# Patient Record
Sex: MALE | Race: WHITE | Employment: OTHER | URBAN - METROPOLITAN AREA
[De-identification: names, ages, dates, MRNs, and addresses within clinical notes are randomized per-mention and may not be internally consistent; named-entity substitution may affect disease eponyms.]

---

## 2018-01-18 ENCOUNTER — HOSPITAL ENCOUNTER (OUTPATIENT)
Dept: CT IMAGING | Age: 73
Discharge: HOME | End: 2018-01-18
Attending: PHYSICAL THERAPY ASSISTANT

## 2018-01-18 DIAGNOSIS — Z13.6 ENCOUNTER FOR SPECIAL SCREENING EXAMINATION FOR CARDIOVASCULAR DISORDER: ICD-10-CM

## 2018-02-09 NOTE — PROGRESS NOTES
Patient seen in [place] for Heart Scan. 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 I called and spoke with patient who states that he just returned from [place] last night.  Patient is not a

## 2018-02-22 PROBLEM — R93.1 ELEVATED CORONARY ARTERY CALCIUM SCORE: Status: ACTIVE | Noted: 2018-02-22

## 2018-02-22 PROBLEM — I49.3 PVC (PREMATURE VENTRICULAR CONTRACTION): Status: ACTIVE | Noted: 2018-02-22

## 2018-02-22 PROBLEM — I25.5 ISCHEMIC CARDIOMYOPATHY: Status: ACTIVE | Noted: 2018-02-22

## 2018-02-22 PROBLEM — I65.22 STENOSIS OF LEFT CAROTID ARTERY: Status: ACTIVE | Noted: 2018-02-22

## 2018-02-22 PROBLEM — I34.0 NON-RHEUMATIC MITRAL REGURGITATION: Status: ACTIVE | Noted: 2018-02-22

## 2021-07-18 ENCOUNTER — HOSPITAL ENCOUNTER (EMERGENCY)
Facility: HOSPITAL | Age: 76
Discharge: HOME | End: 2021-07-18
Attending: EMERGENCY MEDICINE
Payer: MEDICARE

## 2021-07-18 VITALS
OXYGEN SATURATION: 100 % | RESPIRATION RATE: 20 BRPM | DIASTOLIC BLOOD PRESSURE: 75 MMHG | HEART RATE: 84 BPM | TEMPERATURE: 98 F | SYSTOLIC BLOOD PRESSURE: 116 MMHG
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
## 2021-07-18 DIAGNOSIS — S30.842A HAIR TOURNIQUET OF PENIS, INITIAL ENCOUNTER: Primary | ICD-10-CM

## 2021-07-18 PROCEDURE — 99283 EMERGENCY DEPT VISIT LOW MDM: CPT

## 2021-07-18 PROCEDURE — 90471 IMMUNIZATION ADMIN: CPT

## 2021-07-18 RX ORDER — LIDOCAINE HYDROCHLORIDE 20 MG/ML
10 SOLUTION OROPHARYNGEAL ONCE
Status: COMPLETED | OUTPATIENT
Start: 2021-07-18 | End: 2021-07-18

## 2021-07-18 RX ORDER — CEPHALEXIN 500 MG/1
500 CAPSULE ORAL 3 TIMES DAILY
Qty: 21 CAPSULE | Refills: 0 | Status: SHIPPED | OUTPATIENT
Start: 2021-07-18 | End: 2021-07-25

## 2021-07-18 NOTE — ED PROVIDER NOTES
Patient Seen in: [facility] Emergency Department      History   Patient presents with:  Eval-G    Stated Complaint: toma pain    HPI/Subjective:   HPI    Patient complains of pain and swelling to the penis.   Patient tells me that over the course of are descended in the scrotum is completely nonerythematous. Testes are freely mobile and completely nontender. The more proximal penis is unremarkable. There does seem to be a circumferential constriction just proximal to the glans.   In examining this a Medication List as of 7/18/2021  3:49 PM    START taking these medications    cephALEXin 500 MG Oral Cap  Take 1 capsule (500 mg total) by mouth 3 (three) times daily for 7 days. , Normal, Disp-21 capsule, R-0

## 2021-07-18 NOTE — ED INITIAL ASSESSMENT (HPI)
Pt to the emergency room for swelling below the head of the penis. Pt reports it started yesterday and has been worsening since. No prior hx of injury to the site. Pt denies pain with urination, urgency and frequency.

## 2021-07-29 ENCOUNTER — ORDER TRANSCRIPTION (OUTPATIENT)
Dept: PHYSICAL THERAPY | Facility: HOSPITAL | Age: 76
End: 2021-07-29

## 2021-07-29 DIAGNOSIS — R26.89 IMBALANCE: ICD-10-CM

## 2021-07-29 DIAGNOSIS — R26.9 GAIT ABNORMALITY: ICD-10-CM

## 2021-07-29 DIAGNOSIS — G95.9 LUMBAR MYELOPATHY (HCC): Primary | ICD-10-CM

## 2021-07-31 ENCOUNTER — APPOINTMENT (OUTPATIENT)
Dept: CT IMAGING | Facility: HOSPITAL | Age: 76
DRG: 552 | End: 2021-07-31
Attending: EMERGENCY MEDICINE
Payer: MEDICARE

## 2021-07-31 ENCOUNTER — HOSPITAL ENCOUNTER (INPATIENT)
Facility: HOSPITAL | Age: 76
LOS: 3 days | Discharge: NURSING, REHAB OR LONG-TERM CARE | DRG: 552 | End: 2021-08-03
Attending: EMERGENCY MEDICINE | Admitting: HOSPITALIST
Payer: MEDICARE

## 2021-07-31 DIAGNOSIS — R26.81 UNSTEADY GAIT WHEN WALKING: ICD-10-CM

## 2021-07-31 DIAGNOSIS — R29.6 FREQUENT FALLS: Primary | ICD-10-CM

## 2021-07-31 LAB
ALBUMIN SERPL-MCNC: 3.3 G/DL (ref 3.4–5)
ALBUMIN/GLOB SERPL: 0.9 {RATIO} (ref 1–2)
ALP LIVER SERPL-CCNC: 50 U/L
ALT SERPL-CCNC: 38 U/L
ANION GAP SERPL CALC-SCNC: 2 MMOL/L (ref 0–18)
AST SERPL-CCNC: 18 U/L (ref 15–37)
BASOPHILS # BLD AUTO: 0.04 X10(3) UL (ref 0–0.2)
BASOPHILS NFR BLD AUTO: 0.5 %
BILIRUB SERPL-MCNC: 0.4 MG/DL (ref 0.1–2)
BILIRUB UR QL STRIP.AUTO: NEGATIVE
BUN BLD-MCNC: 15 MG/DL (ref 7–18)
CALCIUM BLD-MCNC: 8.7 MG/DL (ref 8.5–10.1)
CHLORIDE SERPL-SCNC: 102 MMOL/L (ref 98–112)
CLARITY UR REFRACT.AUTO: CLEAR
CO2 SERPL-SCNC: 29 MMOL/L (ref 21–32)
CREAT BLD-MCNC: 0.97 MG/DL
EOSINOPHIL # BLD AUTO: 0.24 X10(3) UL (ref 0–0.7)
EOSINOPHIL NFR BLD AUTO: 3 %
ERYTHROCYTE [DISTWIDTH] IN BLOOD BY AUTOMATED COUNT: 13.7 %
GLOBULIN PLAS-MCNC: 3.5 G/DL (ref 2.8–4.4)
GLUCOSE BLD-MCNC: 100 MG/DL (ref 70–99)
GLUCOSE UR STRIP.AUTO-MCNC: NEGATIVE MG/DL
HAV IGM SER QL: 2.1 MG/DL (ref 1.6–2.6)
HCT VFR BLD AUTO: 44 %
HGB BLD-MCNC: 14.7 G/DL
HYALINE CASTS #/AREA URNS AUTO: PRESENT /LPF
IMM GRANULOCYTES # BLD AUTO: 0.02 X10(3) UL (ref 0–1)
IMM GRANULOCYTES NFR BLD: 0.2 %
KETONES UR STRIP.AUTO-MCNC: NEGATIVE MG/DL
LEUKOCYTE ESTERASE UR QL STRIP.AUTO: NEGATIVE
LYMPHOCYTES # BLD AUTO: 1.21 X10(3) UL (ref 1–4)
LYMPHOCYTES NFR BLD AUTO: 14.9 %
M PROTEIN MFR SERPL ELPH: 6.8 G/DL (ref 6.4–8.2)
MCH RBC QN AUTO: 29.2 PG (ref 26–34)
MCHC RBC AUTO-ENTMCNC: 33.4 G/DL (ref 31–37)
MCV RBC AUTO: 87.5 FL
MONOCYTES # BLD AUTO: 1.43 X10(3) UL (ref 0.1–1)
MONOCYTES NFR BLD AUTO: 17.6 %
NEUTROPHILS # BLD AUTO: 5.19 X10 (3) UL (ref 1.5–7.7)
NEUTROPHILS # BLD AUTO: 5.19 X10(3) UL (ref 1.5–7.7)
NEUTROPHILS NFR BLD AUTO: 63.8 %
NITRITE UR QL STRIP.AUTO: NEGATIVE
OSMOLALITY SERPL CALC.SUM OF ELEC: 277 MOSM/KG (ref 275–295)
PH UR STRIP.AUTO: 5 [PH] (ref 5–8)
PLATELET # BLD AUTO: 251 10(3)UL (ref 150–450)
POTASSIUM SERPL-SCNC: 4.6 MMOL/L (ref 3.5–5.1)
PROT UR STRIP.AUTO-MCNC: NEGATIVE MG/DL
RBC # BLD AUTO: 5.03 X10(6)UL
RBC UR QL AUTO: NEGATIVE
SARS-COV-2 RNA RESP QL NAA+PROBE: NOT DETECTED
SODIUM SERPL-SCNC: 133 MMOL/L (ref 136–145)
SP GR UR STRIP.AUTO: 1.01 (ref 1–1.03)
T4 FREE SERPL-MCNC: 1 NG/DL (ref 0.8–1.7)
TSI SER-ACNC: 1.36 MIU/ML (ref 0.36–3.74)
UROBILINOGEN UR STRIP.AUTO-MCNC: <2 MG/DL
WBC # BLD AUTO: 8.1 X10(3) UL (ref 4–11)

## 2021-07-31 PROCEDURE — 70450 CT HEAD/BRAIN W/O DYE: CPT | Performed by: EMERGENCY MEDICINE

## 2021-07-31 PROCEDURE — 99223 1ST HOSP IP/OBS HIGH 75: CPT | Performed by: HOSPITALIST

## 2021-07-31 PROCEDURE — 72131 CT LUMBAR SPINE W/O DYE: CPT | Performed by: EMERGENCY MEDICINE

## 2021-07-31 RX ORDER — ONDANSETRON 2 MG/ML
4 INJECTION INTRAMUSCULAR; INTRAVENOUS EVERY 6 HOURS PRN
Status: DISCONTINUED | OUTPATIENT
Start: 2021-07-31 | End: 2021-08-03

## 2021-07-31 RX ORDER — ALPRAZOLAM 0.5 MG/1
0.5 TABLET ORAL 2 TIMES DAILY
Status: DISCONTINUED | OUTPATIENT
Start: 2021-07-31 | End: 2021-08-02

## 2021-07-31 RX ORDER — METOCLOPRAMIDE HYDROCHLORIDE 5 MG/ML
10 INJECTION INTRAMUSCULAR; INTRAVENOUS EVERY 8 HOURS PRN
Status: DISCONTINUED | OUTPATIENT
Start: 2021-07-31 | End: 2021-08-02

## 2021-07-31 RX ORDER — MONTELUKAST SODIUM 10 MG/1
10 TABLET ORAL NIGHTLY
Status: DISCONTINUED | OUTPATIENT
Start: 2021-07-31 | End: 2021-08-03

## 2021-07-31 RX ORDER — MONTELUKAST SODIUM 10 MG/1
10 TABLET ORAL NIGHTLY
COMMUNITY

## 2021-07-31 RX ORDER — SODIUM CHLORIDE 9 MG/ML
INJECTION, SOLUTION INTRAVENOUS CONTINUOUS
Status: DISCONTINUED | OUTPATIENT
Start: 2021-07-31 | End: 2021-08-02

## 2021-07-31 RX ORDER — GUAIFENESIN 600 MG
1200 TABLET, EXTENDED RELEASE 12 HR ORAL NIGHTLY
Status: DISCONTINUED | OUTPATIENT
Start: 2021-07-31 | End: 2021-08-03

## 2021-07-31 RX ORDER — ENOXAPARIN SODIUM 100 MG/ML
40 INJECTION SUBCUTANEOUS DAILY
Status: DISCONTINUED | OUTPATIENT
Start: 2021-08-01 | End: 2021-08-03

## 2021-07-31 RX ORDER — CYCLOBENZAPRINE HCL 10 MG
10 TABLET ORAL NIGHTLY
COMMUNITY
End: 2021-07-31

## 2021-07-31 RX ORDER — DOCUSATE SODIUM 100 MG/1
100 CAPSULE, LIQUID FILLED ORAL DAILY
COMMUNITY

## 2021-07-31 RX ORDER — ATORVASTATIN CALCIUM 20 MG/1
20 TABLET, FILM COATED ORAL DAILY
COMMUNITY

## 2021-07-31 RX ORDER — DESVENLAFAXINE 100 MG/1
100 TABLET, EXTENDED RELEASE ORAL DAILY
Status: CANCELLED | OUTPATIENT
Start: 2021-08-01

## 2021-07-31 RX ORDER — MIRABEGRON 50 MG/1
50 TABLET, FILM COATED, EXTENDED RELEASE ORAL DAILY
COMMUNITY

## 2021-07-31 RX ORDER — ISOSORBIDE MONONITRATE 30 MG/1
30 TABLET, EXTENDED RELEASE ORAL DAILY
COMMUNITY

## 2021-07-31 RX ORDER — DESVENLAFAXINE 100 MG/1
100 TABLET, EXTENDED RELEASE ORAL DAILY
Status: ON HOLD | COMMUNITY
End: 2021-08-03

## 2021-07-31 RX ORDER — ALPRAZOLAM 0.5 MG/1
0.5 TABLET ORAL 2 TIMES DAILY
Status: ON HOLD | COMMUNITY
End: 2021-08-03

## 2021-07-31 RX ORDER — ISOSORBIDE MONONITRATE 30 MG/1
30 TABLET, EXTENDED RELEASE ORAL DAILY
Status: DISCONTINUED | OUTPATIENT
Start: 2021-08-01 | End: 2021-08-03

## 2021-07-31 RX ORDER — ATORVASTATIN CALCIUM 20 MG/1
20 TABLET, FILM COATED ORAL DAILY
Status: DISCONTINUED | OUTPATIENT
Start: 2021-08-01 | End: 2021-08-03

## 2021-07-31 RX ORDER — DOCUSATE SODIUM 100 MG/1
100 CAPSULE, LIQUID FILLED ORAL DAILY
Status: DISCONTINUED | OUTPATIENT
Start: 2021-08-01 | End: 2021-08-03

## 2021-07-31 RX ORDER — ASPIRIN 81 MG/1
81 TABLET ORAL DAILY
Status: DISCONTINUED | OUTPATIENT
Start: 2021-07-31 | End: 2021-08-03

## 2021-07-31 RX ORDER — CYCLOBENZAPRINE HCL 10 MG
10 TABLET ORAL NIGHTLY
Status: ON HOLD | COMMUNITY
End: 2021-08-03

## 2021-07-31 RX ORDER — ATORVASTATIN CALCIUM 20 MG/1
20 TABLET, FILM COATED ORAL NIGHTLY
Status: DISCONTINUED | OUTPATIENT
Start: 2021-07-31 | End: 2021-07-31

## 2021-07-31 RX ORDER — ACETAMINOPHEN 325 MG/1
650 TABLET ORAL EVERY 6 HOURS PRN
Status: DISCONTINUED | OUTPATIENT
Start: 2021-07-31 | End: 2021-08-03

## 2021-07-31 RX ORDER — FLUTICASONE FUROATE AND VILANTEROL TRIFENATATE 100; 25 UG/1; UG/1
1 POWDER RESPIRATORY (INHALATION) DAILY
COMMUNITY

## 2021-07-31 RX ORDER — SODIUM CHLORIDE 9 MG/ML
INJECTION, SOLUTION INTRAVENOUS CONTINUOUS
Status: ACTIVE | OUTPATIENT
Start: 2021-07-31 | End: 2021-07-31

## 2021-07-31 RX ORDER — GUAIFENESIN 600 MG
600 TABLET, EXTENDED RELEASE 12 HR ORAL NIGHTLY
Status: ON HOLD | COMMUNITY
End: 2021-08-03

## 2021-07-31 NOTE — H&P
[facility]ITAL[facility]  History and Physical     Sherri Combs Patient Status:  Emergency    10/7/1945 MRN [id number]   Location [address] Attending Francis Encinas, [address] [facility] Day # 0 PCP MARILEE BOWMAN     Chief Complaint: Falls    Hi Relation Age of Onset   • Stroke Mother         Allergies:   Erythromycin            UNKNOWN    Comment:Cerner Allergy Text Annotation: erythromycin  Tetracycline            UNKNOWN    Comment:Cerner Allergy Text Annotation: tetracycline    Medications:  N nondistended. Positive bowel sounds. No rebound, guarding or organomegaly. Neurologic: Power 5/5 bilaterally, sensory intact. Patient with hyperreflexia in all ext. Musculoskeletal: Moves all extremities. Extremities: No edema or cyanosis.   Integument: 9. YVONNE  1. YVONNE protocol  10. Depression  11. Anxiety  12. PTSD  1. Stop Pristiq  2. Xanax BID  3. Psych consult   13. Prostate cancer      Quality:  · DVT Prophylaxis: Lovenox  · Love: No    Plan of care discussed with patient, caregiver and ER.     Mile

## 2021-07-31 NOTE — ED PROVIDER NOTES
Patient Seen in: [facility] Emergency Department      History   Patient presents with:  Dizziness  Fatigue  Fall    Stated Complaint: feeling off balance, increased weakness over past 3 days    HPI/Subjective:   HPI    13-year-old male presents madiha Smokeless tobacco: Former User    Alcohol use: Yes    Drug use: No             Review of Systems    Positive for stated complaint: feeling off balance, increased weakness over past 3 days  Other systems are as noted in HPI.   Constitutional and vital signs Sodium 133 (*)     Albumin 3.3 (*)     A/G Ratio 0.9 (*)     All other components within normal limits   URINALYSIS WITH CULTURE REFLEX - Abnormal; Notable for the following components:    Squamous Epi.  Cells Few (*)     Hyaline Casts Present (*)     All o now frequent falls increasing the last 24 hours. Patient did fall and hit his head today. Patient will require admission for further evaluation, discussed with Dr. Derek Suh hospitalist physician.   Discussed all results with the patient and care at the beds

## 2021-07-31 NOTE — ED INITIAL ASSESSMENT (HPI)
Patient relates he experienced two falls from standing today after feeling like he lost his balance. Patient denies LOC but relates he thinks he did strike his head on the floor. Patient was complaining of lower back pain that has since resolved.

## 2021-08-01 LAB
ALBUMIN SERPL-MCNC: 3.2 G/DL (ref 3.4–5)
ALBUMIN/GLOB SERPL: 0.9 {RATIO} (ref 1–2)
ALP LIVER SERPL-CCNC: 50 U/L
ALT SERPL-CCNC: 37 U/L
ANION GAP SERPL CALC-SCNC: 3 MMOL/L (ref 0–18)
AST SERPL-CCNC: 18 U/L (ref 15–37)
ATRIAL RATE: 84 BPM
BASOPHILS # BLD AUTO: 0.05 X10(3) UL (ref 0–0.2)
BASOPHILS NFR BLD AUTO: 0.6 %
BILIRUB SERPL-MCNC: 0.5 MG/DL (ref 0.1–2)
BUN BLD-MCNC: 13 MG/DL (ref 7–18)
CALCIUM BLD-MCNC: 8.7 MG/DL (ref 8.5–10.1)
CHLORIDE SERPL-SCNC: 106 MMOL/L (ref 98–112)
CO2 SERPL-SCNC: 29 MMOL/L (ref 21–32)
CREAT BLD-MCNC: 0.83 MG/DL
EOSINOPHIL # BLD AUTO: 0.33 X10(3) UL (ref 0–0.7)
EOSINOPHIL NFR BLD AUTO: 3.8 %
ERYTHROCYTE [DISTWIDTH] IN BLOOD BY AUTOMATED COUNT: 13.5 %
GLOBULIN PLAS-MCNC: 3.4 G/DL (ref 2.8–4.4)
GLUCOSE BLD-MCNC: 95 MG/DL (ref 70–99)
HCT VFR BLD AUTO: 45 %
HGB BLD-MCNC: 14.2 G/DL
IMM GRANULOCYTES # BLD AUTO: 0.03 X10(3) UL (ref 0–1)
IMM GRANULOCYTES NFR BLD: 0.3 %
LYMPHOCYTES # BLD AUTO: 1.38 X10(3) UL (ref 1–4)
LYMPHOCYTES NFR BLD AUTO: 16.1 %
M PROTEIN MFR SERPL ELPH: 6.6 G/DL (ref 6.4–8.2)
MCH RBC QN AUTO: 28.2 PG (ref 26–34)
MCHC RBC AUTO-ENTMCNC: 31.6 G/DL (ref 31–37)
MCV RBC AUTO: 89.3 FL
MONOCYTES # BLD AUTO: 1.31 X10(3) UL (ref 0.1–1)
MONOCYTES NFR BLD AUTO: 15.3 %
NEUTROPHILS # BLD AUTO: 5.49 X10 (3) UL (ref 1.5–7.7)
NEUTROPHILS # BLD AUTO: 5.49 X10(3) UL (ref 1.5–7.7)
NEUTROPHILS NFR BLD AUTO: 63.9 %
OSMOLALITY SERPL CALC.SUM OF ELEC: 286 MOSM/KG (ref 275–295)
P AXIS: 40 DEGREES
P-R INTERVAL: 188 MS
PLATELET # BLD AUTO: 248 10(3)UL (ref 150–450)
POTASSIUM SERPL-SCNC: 4.4 MMOL/L (ref 3.5–5.1)
Q-T INTERVAL: 392 MS
QRS DURATION: 130 MS
QTC CALCULATION (BEZET): 463 MS
R AXIS: 16 DEGREES
RBC # BLD AUTO: 5.04 X10(6)UL
SODIUM SERPL-SCNC: 138 MMOL/L (ref 136–145)
T AXIS: -24 DEGREES
VENTRICULAR RATE: 84 BPM
VIT B12 SERPL-MCNC: 429 PG/ML (ref 193–986)
WBC # BLD AUTO: 8.6 X10(3) UL (ref 4–11)

## 2021-08-01 PROCEDURE — 99223 1ST HOSP IP/OBS HIGH 75: CPT | Performed by: OTHER

## 2021-08-01 PROCEDURE — 99232 SBSQ HOSP IP/OBS MODERATE 35: CPT | Performed by: HOSPITALIST

## 2021-08-01 NOTE — OCCUPATIONAL THERAPY NOTE
OCCUPATIONAL THERAPY EVALUATION - INPATIENT     Room Number: 160/620-W  Evaluation Date: 8/1/2021  Type of Evaluation: Initial  Presenting Problem: falls    Physician Order: IP Consult to Occupational Therapy  Reason for Therapy: ADL/IADL Dysfunction and D Hearing aides; Reading glasses    Prior Level of Function: Patient reports independent in all I/ADL and typically functional mobility without device prior to admission, although has been using rollator in community and walking sticks in house lately.  When h taking off regular upper body clothing?: A Little (setup)  -   Taking care of personal grooming such as brushing teeth?: A Little (in standing)  -   Eating meals?: A Little (setup)    AM-PAC Score:  Score: 17  Approx Degree of Impairment: 50.11%  Standardi adaptive techniques.  These deficits impact the patient’s ability to participate in lower body dressing/bathing, toileting, toilet transfers, bed mobility, functional mobility, instrumental activities of daily living, rest and sleep, work, leisure and Kash moines will perform lower body dressing with Mod I  Patient will perform toileting with Mod I    FUNCTIONAL TRANSFER GOALS   Patient will perform supine to sit with Mod I  Patient will perform sit to supine with Mod I  Patient will transfer to toilet with Mod I

## 2021-08-01 NOTE — CONSULTS
[address] [place] [address] with [facility]  8/1/2021    10:48 AM      Consultation was requested because of falling. Patient is known to have small vessel disease on a recent MRI.   He has a longstanding history of be thyromegaly  Heart S1S2, no murmur  Lungs are clear, no wheezing  Abd: soft  Extremities: no cyanosis or edema    NEURO  On examination the patient does not exhibit any bradykinesia or any truncal rigidity. No tremors.   He exhibits a slightly broad-based

## 2021-08-01 NOTE — PLAN OF CARE
Patient alert and oriented x 4 ,resting comfortable ,denies any pain ,neuro check intact ,moving all extremities well ,vital signs stable ,hx of deidra using mouth guard with head band ,tele sinus rhythm ,orthostatic blood pressure done as ordered ,voiding pe

## 2021-08-01 NOTE — PROGRESS NOTES
08/01/21 1458   Clinical Encounter Type   Visited With Patient   Routine Visit Introduction   Continue Visiting No  (upon request or as needed)   Patient Spiritual Encounters   Spiritual Needs patient has spiritual strength   Spiritual Interventions priti

## 2021-08-01 NOTE — PHYSICAL THERAPY NOTE
PHYSICAL THERAPY EVALUATION - INPATIENT     Room Number: 408/417-A  Evaluation Date: 8/1/2021  Type of Evaluation: Initial  Physician Order: PT Eval and Treat    Presenting Problem: fall, weakness  Reason for Therapy: Mobility Dysfunction and Dischar prostatet cancer, right hip repacement.        HOME SITUATION  [place]   Home Layout: Two level  Stairs to Enter : 2     Stairs to Bedroom: 13  Railing: Yes    Lives With: Friend(s)  Drives: No (not recently, hopes to resume)  Patient Owned O2 WALK       AM-PAC '6-Clicks' INPATIENT SHORT FORM - BASIC MOBILITY  How much difficulty does the patient currently have. ..  -   Turning over in bed (including adjusting bedclothes, sheets and blankets)?: None   -   Sitting down on and standing up fr goals for the next session. Emphasized to patient need to call staff for any transfers/standing/gait to decrease fall risk. Patient End of Session: Up in chair;Needs met;Call light within reach;RN aware of session/findings; All patient questions and con assistance level: independent     Goal #3 Patient is able to ambulate 200 feet with assist device: none at assistance level: modified independent     Goal #4 Patient to be modified independent ascending/descending full flight of stairs with 2 railings   Go

## 2021-08-02 ENCOUNTER — APPOINTMENT (OUTPATIENT)
Dept: MRI IMAGING | Facility: HOSPITAL | Age: 76
DRG: 552 | End: 2021-08-02
Attending: Other
Payer: MEDICARE

## 2021-08-02 PROCEDURE — 99232 SBSQ HOSP IP/OBS MODERATE 35: CPT | Performed by: OTHER

## 2021-08-02 PROCEDURE — 72148 MRI LUMBAR SPINE W/O DYE: CPT | Performed by: OTHER

## 2021-08-02 PROCEDURE — 99231 SBSQ HOSP IP/OBS SF/LOW 25: CPT | Performed by: HOSPITALIST

## 2021-08-02 PROCEDURE — 90792 PSYCH DIAG EVAL W/MED SRVCS: CPT | Performed by: OTHER

## 2021-08-02 RX ORDER — DESVENLAFAXINE 25 MG/1
25 TABLET, EXTENDED RELEASE ORAL DAILY
Status: DISCONTINUED | OUTPATIENT
Start: 2021-08-05 | End: 2021-08-03

## 2021-08-02 RX ORDER — ALPRAZOLAM 0.25 MG/1
0.25 TABLET ORAL 2 TIMES DAILY
Status: DISCONTINUED | OUTPATIENT
Start: 2021-08-02 | End: 2021-08-03

## 2021-08-02 RX ORDER — DESVENLAFAXINE 50 MG/1
50 TABLET, EXTENDED RELEASE ORAL DAILY
Status: DISCONTINUED | OUTPATIENT
Start: 2021-08-02 | End: 2021-08-03

## 2021-08-02 NOTE — OCCUPATIONAL THERAPY NOTE
OCCUPATIONAL THERAPY TREATMENT NOTE - INPATIENT     Room Number: 813/552-Z  Session: 1   Number of Visits to Meet Established Goals: 4    Presenting Problem: falls    History related to current admission: Patient is 66year old male admitted on 7/31/2021 f clothing?: A Lot  -   Bathing (including washing, rinsing, drying)?: A Little  -   Toileting, which includes using toilet, bedpan or urinal? : A Little (simulated)  -   Putting on and taking off regular upper body clothing?: A Little (setup)  -   Taking ca function. OT Discharge Recommendations: Sub-acute rehabilitation  OT Device Recommendations: TBD    PLAN  OT Treatment Plan: Balance activities; ADL training;Functional transfer training; Endurance training;Patient/Family education;Patient/Family training

## 2021-08-02 NOTE — CONSULTS
I did have an opportunity to speak with Mr. Lida Travis this afternoon via the telephone. He was referred by Dr. Verito Mcclendon ostensibly to arrange for outpatient individual psychotherapy.  In our conversation he acknowledged a history of depression and experssed his

## 2021-08-02 NOTE — PHYSICAL THERAPY NOTE
PHYSICAL THERAPY TREATMENT NOTE - INPATIENT    Room Number: 927/818-L     Session: 1   Number of Visits to Meet Established Goals: 2    Presenting Problem: fall, weakness      History related to current admission: Patient came in secondary to 2 falls on 7 self-stated goal is to get stronger.     OBJECTIVE  Precautions: Bed/chair alarm    WEIGHT BEARING RESTRICTION  Weight Bearing Restriction: None                PAIN ASSESSMENT   Ratin  Location: denies pain       BALANCE strength and balance with focus on abdominal bracing during therapeutic ex to support lumbar spine. Pt able to complete ex without difficulty. Pt educated on continuing therapeutic ex for increase strength.   Pt left in sitting with chair alarm set, call l

## 2021-08-02 NOTE — CONSULTS
PHYSICAL MEDICINE AND REHABILITATION CONSULTATION       Location [facility] 3SW-A Attending Ce Puentes MD   Hosp Day # 2 PCP [address] [facility]     Patient Identification  Pradeep Isabel is a 66year old male.   :  10/7/1945  Admit Date:  2021  A hr tab 30 mg, 30 mg, Oral, Daily  guaiFENesin ER (MUCINEX) 12 hr tab 1,200 mg, 1,200 mg, Oral, Nightly  Mirabegron ER TB24 50 mg, 50 mg, Oral, Daily  Montelukast Sodium (SINGULAIR) tab 10 mg, 10 mg, Oral, Nightly  enoxaparin (LOVENOX) 40 MG/0.4ML injection (1.803 m)   Wt 168 lb (76.2 kg)   SpO2 98%   BMI 23.43 kg/m²   Body mass index is 23.43 kg/m². General: well nourished, NAD  Eyes: conjunctiva and lids intact, pupils are equal and round  ENMT: external inspection of ears and nose within normal limits.  Raf Benítez

## 2021-08-02 NOTE — CM/SW NOTE
Met with pt and provided initial list from [facility] of status of referrals for LANDRY. Discussed that final list will be available for patient in AM.  Pt confirmed he would like his friend, Spencer Up to be involved in [address].   He will obtain her email so th

## 2021-08-02 NOTE — PLAN OF CARE
Problem: Falls  Data: Ax04. [place], hearing aids. Neurochecks. Full range of motion. Telemetry sinus rhythm. , on room air, mouthguard overnight. Afebrile. Denied pain. Continent. Up with 1 walker. Regular diet. IVF. Fall precautions.  Scheduled for MRI of t

## 2021-08-02 NOTE — PROGRESS NOTES
[address] [facility] Progress Note    Selwyn Velez Patient Status:  Inpatient    10/7/1945 MRN [id number]   [facility] 3SW-A Attending Romero Ortiz MD   Hosp Day # 2 PCP MARILEE BOWMAN     Subjective:  Selwyn Velez is a(n) 66 Component Value Date    K 4.4 08/01/2021    K 4.6 07/31/2021    K 4.30 11/28/2018     Lab Results   Component Value Date    BUN 13 08/01/2021    BUN 15 07/31/2021    BUN 14 11/28/2018     Lab Results   Component Value Date    CREATSERUM 0.83 08/01/2021 Mild to moderate bilateral SI joint arthropathy.     Dictated by (CST): Verlon Fails, DO on 7/31/2021 at 5:09 PM     Finalized by (CST): Verlon Fails, DO on 7/31/2021 at 5:14 PM       Per report  MRI brain with and without contrast (7/26/2021):     Multiple

## 2021-08-02 NOTE — PROGRESS NOTES
[facility]  Progress Note     Selwyn Sjogren Patient Status:  Inpatient    10/7/1945 MRN [id number]   [facility] 3SW-A Attending Romero Ortiz MD   Hosp Day # 2 PCP MARILEE BOWMAN     Chief Complaint: Falls    S: Patient denies any p Kinase  No results for input(s): CK in the last 168 hours. Inflammatory Markers  No results for input(s): CRP, LEAH, LDH, DDIMER in the last 168 hours. Imaging: Imaging data reviewed in Epic.     Medications:   • ALPRAZolam  0.5 mg Oral BID   • aspirin

## 2021-08-02 NOTE — CONSULTS
[facility]  Report of Psychiatric Consultation    Joanie Landry Patient Status:  Inpatient    10/7/1945 MRN [id number]   [facility] 3SW-A Attending Elizabeth Sebastian MD   [facility] Day # 2 PCP MARILEE BOWMAN     Date of Admission: 21  Date talk to him and make sure he has his office information.      Evelyn Agarwal  8/2/2021  11:02 AM    History of Present Illness:  65 yo WM  for over 48 yrs with severe major depression was admitted on 7/31/21 for eval of his unstable gait and recent falls swimming and went back to the [facility]. He took his clothes off and checked his phone. 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 They requested he give more talks.  This is when he acc Developmental History: Retired . See HPI.      Past Medical History:   Diagnosis Date   • Ataxia    • Atherosclerosis of coronary artery    • Depression    • Heart attack ([facility] [address]    • PVC (premature ventricular contraction)    • Stroke ([facility])     min Status Exam:     Objective       08/02/21  0750   BP: 124/89   Pulse: 75   Resp: 18   Temp: 98.7 °F (37.1 °C)     Appearance: fair grooming  Behavior: normal psychomotor  Attitude: cooperative and consistent  Gait: not observed    Speech: normal rate, rhyt

## 2021-08-02 NOTE — CM/SW NOTE
08/02/21 1500   CM/SW Referral Data   Referral Source Social Work (self-referral)   Reason for Referral Discharge planning   Informant Patient;Friend;Edward Staff   Patient Info   Patient's Mental Status Alert;Oriented;Memory Impairments   Patient's Yovani decisions. They would like to be involved in DC planning decisions. They also prefer acute rehab and she would like to speak with WILL SANCHES.      Read Flood with Wilhelm Bumpers from [facility]  who will contact pt's friend/sister to address their concerns.   /case primo

## 2021-08-03 VITALS
RESPIRATION RATE: 18 BRPM | OXYGEN SATURATION: 95 % | TEMPERATURE: 99 F | BODY MASS INDEX: 23.52 KG/M2 | WEIGHT: 168 LBS | HEIGHT: 71 IN | DIASTOLIC BLOOD PRESSURE: 69 MMHG | SYSTOLIC BLOOD PRESSURE: 115 MMHG | HEART RATE: 77 BPM

## 2021-08-03 PROCEDURE — 99239 HOSP IP/OBS DSCHRG MGMT >30: CPT | Performed by: HOSPITALIST

## 2021-08-03 PROCEDURE — 99232 SBSQ HOSP IP/OBS MODERATE 35: CPT | Performed by: OTHER

## 2021-08-03 RX ORDER — DESVENLAFAXINE 25 MG/1
TABLET, EXTENDED RELEASE ORAL
Qty: 21 TABLET | Refills: 0 | Status: SHIPPED | OUTPATIENT
Start: 2021-08-03

## 2021-08-03 RX ORDER — GUAIFENESIN 1200 MG/1
1200 TABLET, EXTENDED RELEASE ORAL NIGHTLY
Refills: 0 | Status: SHIPPED | COMMUNITY
Start: 2021-08-03

## 2021-08-03 RX ORDER — ALPRAZOLAM 0.5 MG/1
0.25 TABLET ORAL 2 TIMES DAILY
Qty: 1 TABLET | Refills: 0 | Status: SHIPPED | COMMUNITY
Start: 2021-08-03 | End: 2021-08-05

## 2021-08-03 NOTE — PLAN OF CARE
Denies pain. Instructed on plan of care, call don't fall protocol, call for all asst needed. Gait steady. Safety precautions maintained. Discharge plan to sub-acute rehab. Message left on voice mail for mri dept regarding order for mri.
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
## 2021-08-03 NOTE — CM/SW NOTE
Met with pt and presented AIDIN list of accepting facilities. Pt confirmed preference for [facility]. Discussed possible DC later today. Pt agreeable with SW contacting pt's friend, Anita Levi [phone].   Spoke with Anita Levi who expressed agreement wi

## 2021-08-03 NOTE — PROGRESS NOTES
NURSING DISCHARGE NOTE    Discharged Nursing home via Wheelchair. Accompanied by Friend  Belongings Taken by patient/family. PIV removed. Patient home meds returned to patient prior to discharge.  Discharge education provided and all questions answere

## 2021-08-03 NOTE — DISCHARGE SUMMARY
[facility] HOSPITALIST  DISCHARGE SUMMARY     Alexus Butler Patient Status:  Inpatient    10/7/1945 MRN [id number]   [facility] 3SW-A Attending Luciana Epley, MD   [facility] Day # 3 PCP MARILEE BOWMAN     Date of Admission: 2021  Date of Dischar Patient felt to have issues d/t medication (Pristiq). Patient admitted with neurology and psychiatry on consult. Neurologic work-up unremarkable. Pristiq taper in progress. PT/OT recommended [facility]. Discharge to [facility] today, Pristiq taper.     Lace+ Score: 66  5 Take 100 mg by mouth daily.    Refills: 0        STOP taking these medications    COMPLETE PROSTATE HEALTH OR        cyclobenzaprine 10 MG Tabs  Commonly known as: FLEXERIL              Where to Get Your Medications      Please  your prescriptions

## 2021-08-03 NOTE — PLAN OF CARE
Patient alert and oriented x 4 ,impulsive ,noncompliant with safety precautions ,gets up without calling ,bed alarm on all time . MRI of lumbar spine done tonight ,vital signs stable ,denies any chest pain or short of breath,tele with sinus rhythm ,refusing

## 2021-08-04 ENCOUNTER — NURSE ONLY (OUTPATIENT)
Dept: LAB | Age: 76
End: 2021-08-04
Attending: FAMILY MEDICINE
Payer: MEDICARE

## 2021-08-04 ENCOUNTER — EXTERNAL FACILITY (OUTPATIENT)
Dept: FAMILY MEDICINE CLINIC | Facility: CLINIC | Age: 76
End: 2021-08-04

## 2021-08-04 DIAGNOSIS — N32.81 DETRUSOR INSTABILITY OF BLADDER: Primary | ICD-10-CM

## 2021-08-04 DIAGNOSIS — R29.6 FREQUENT FALLS: ICD-10-CM

## 2021-08-04 DIAGNOSIS — Z85.46 HISTORY OF PROSTATE CANCER: ICD-10-CM

## 2021-08-04 DIAGNOSIS — I25.5 ISCHEMIC CARDIOMYOPATHY: ICD-10-CM

## 2021-08-04 DIAGNOSIS — F32.2 MAJOR DEPRESSIVE DISORDER, SINGLE EPISODE, SEVERE (HCC): ICD-10-CM

## 2021-08-04 DIAGNOSIS — R26.81 UNSTEADY GAIT WHEN WALKING: Primary | ICD-10-CM

## 2021-08-04 DIAGNOSIS — Z11.59 SPECIAL SCREENING EXAMINATION FOR VIRAL DISEASE: ICD-10-CM

## 2021-08-04 DIAGNOSIS — R27.0 ATAXIA: ICD-10-CM

## 2021-08-04 LAB
ALBUMIN SERPL-MCNC: 3.3 G/DL (ref 3.4–5)
ALBUMIN/GLOB SERPL: 1 {RATIO} (ref 1–2)
ALP LIVER SERPL-CCNC: 49 U/L
ALT SERPL-CCNC: 46 U/L
ANION GAP SERPL CALC-SCNC: 8 MMOL/L (ref 0–18)
AST SERPL-CCNC: 24 U/L (ref 15–37)
BASOPHILS # BLD AUTO: 0.05 X10(3) UL (ref 0–0.2)
BASOPHILS NFR BLD AUTO: 0.6 %
BILIRUB SERPL-MCNC: 0.5 MG/DL (ref 0.1–2)
BUN BLD-MCNC: 9 MG/DL (ref 7–18)
CALCIUM BLD-MCNC: 9 MG/DL (ref 8.5–10.1)
CHLORIDE SERPL-SCNC: 105 MMOL/L (ref 98–112)
CO2 SERPL-SCNC: 26 MMOL/L (ref 21–32)
CREAT BLD-MCNC: 0.75 MG/DL
EOSINOPHIL # BLD AUTO: 0.16 X10(3) UL (ref 0–0.7)
EOSINOPHIL NFR BLD AUTO: 1.8 %
ERYTHROCYTE [DISTWIDTH] IN BLOOD BY AUTOMATED COUNT: 13.5 %
GLOBULIN PLAS-MCNC: 3.4 G/DL (ref 2.8–4.4)
GLUCOSE BLD-MCNC: 88 MG/DL (ref 70–99)
HAV IGM SER QL: 2 MG/DL (ref 1.6–2.6)
HCT VFR BLD AUTO: 44.3 %
HGB BLD-MCNC: 14.5 G/DL
IMM GRANULOCYTES # BLD AUTO: 0.03 X10(3) UL (ref 0–1)
IMM GRANULOCYTES NFR BLD: 0.3 %
LYMPHOCYTES # BLD AUTO: 1.48 X10(3) UL (ref 1–4)
LYMPHOCYTES NFR BLD AUTO: 16.5 %
M PROTEIN MFR SERPL ELPH: 6.7 G/DL (ref 6.4–8.2)
MCH RBC QN AUTO: 28.7 PG (ref 26–34)
MCHC RBC AUTO-ENTMCNC: 32.7 G/DL (ref 31–37)
MCV RBC AUTO: 87.5 FL
MONOCYTES # BLD AUTO: 1.38 X10(3) UL (ref 0.1–1)
MONOCYTES NFR BLD AUTO: 15.4 %
NEUTROPHILS # BLD AUTO: 5.87 X10 (3) UL (ref 1.5–7.7)
NEUTROPHILS # BLD AUTO: 5.87 X10(3) UL (ref 1.5–7.7)
NEUTROPHILS NFR BLD AUTO: 65.4 %
OSMOLALITY SERPL CALC.SUM OF ELEC: 286 MOSM/KG (ref 275–295)
PATIENT FASTING Y/N/NP: YES
PLATELET # BLD AUTO: 265 10(3)UL (ref 150–450)
POTASSIUM SERPL-SCNC: 4 MMOL/L (ref 3.5–5.1)
RBC # BLD AUTO: 5.06 X10(6)UL
SODIUM SERPL-SCNC: 139 MMOL/L (ref 136–145)
VIT D+METAB SERPL-MCNC: 56.3 NG/ML (ref 30–100)
WBC # BLD AUTO: 9 X10(3) UL (ref 4–11)

## 2021-08-04 PROCEDURE — 99306 1ST NF CARE HIGH MDM 50: CPT | Performed by: FAMILY MEDICINE

## 2021-08-04 PROCEDURE — 83735 ASSAY OF MAGNESIUM: CPT

## 2021-08-04 PROCEDURE — 1111F DSCHRG MED/CURRENT MED MERGE: CPT | Performed by: FAMILY MEDICINE

## 2021-08-04 PROCEDURE — 80053 COMPREHEN METABOLIC PANEL: CPT

## 2021-08-04 PROCEDURE — 82306 VITAMIN D 25 HYDROXY: CPT

## 2021-08-04 PROCEDURE — 85025 COMPLETE CBC W/AUTO DIFF WBC: CPT

## 2021-08-05 NOTE — PROGRESS NOTES
Swathi Bowser  : 10/7/1945  Age 66year old  male patient is admitted to Facility: The [address] [place] [address] for subacute rehab.     [address] [place] [address] Drive date:  21  Discharge date to [facility]:  8/3/21  ELOS:  14 days  Anticipated discharge ananya of coronary artery    • Depression    • Heart attack ([facility] [address]    • PVC (premature ventricular contraction)    • Stroke ([facility] [address]     min     Past Surgical History:   Procedure Laterality Date   • HIP REPLACEMENT SURGERY     • OTHER SURGICAL HISTORY      prostatet daily.         VITALS:  /72   Pulse 75   Temp 97.9 °F (36.6 °C)   Resp 18   Wt 169 lb 6.4 oz (76.8 kg)   SpO2 97%   BMI 23.63 kg/m²      REVIEW OF SYSTEMS:  GENERAL HEALTH:feels well otherwise  SKIN: denies any unusual skin lesions or rashes  WOUNDS: cooperative, mood and affect depressed and anxious  States he feels emotional, working through suppressed emotions, feeling scared.      Therapy Update  Ambulation with RW and SBA  ADLs/Transfers SBA  DC planning TBD    Post hospital discharge readmission r points=Low risk/                    5.8%  5-6 points=Intermediate risk/      12.0%  7 or more points=High risk/         22.8%    DIAGNOSTICS REVIEWED AT THIS VISIT:    Lab Results   Component Value Date    WBC 9.0 08/04/2021    RBC 5.06 08/04/2021    HGB 1 techniques  Feeling improved on taper, side effects were difficult    Hx prostate cancer  myrbetriq 10 mg PO at bedtime  Urinating OK, + nocturia    Pain management  Monitor and assess pain  Tylenol 650 mg Q6 prn    Bowel management  Monitor for Bms  [place]

## 2021-08-05 NOTE — PROGRESS NOTES
Carlito Blake Author: Leonora Hinton MD     10/7/1945 MRN [id number]   [facility]  Admission 21      Last Hospital Discharge 8/3/21 PCP Javy Bennett [address] of Discharge  [facility]        CC --admitted to [facility]note 3[facility] from E of coronary artery    • Depression    • Heart attack ([facility] [address]    • PVC (premature ventricular contraction)    • Stroke ([facility] [address]     min     Past Surgical History:   Procedure Laterality Date   • HIP REPLACEMENT SURGERY     • OTHER SURGICAL HISTORY      prostatet changes,  Respiratory: Denies cough, wheezing or shortness of breath. CV: Denies chest pain, palpitations, orthopnea, PND or dizziness. Musculoskeletal: No joint pain, stiffness or swelling. GI: No nausea, vomiting or diarrhea. No blood in stools.   Baldev Obregon administration, bowel/bladder care, pain/sleep assessment  - Physician supervision for multiple medical comorbidities, fall risk, DVT risk, infection risk, pain management  - Psych for adjustment to disability and cognitive deficits  - Social work/case man

## 2021-08-06 LAB — SARS-COV-2 RNA RESP QL NAA+PROBE: NOT DETECTED

## 2021-08-31 ENCOUNTER — APPOINTMENT (OUTPATIENT)
Dept: PHYSICAL THERAPY | Facility: HOSPITAL | Age: 76
End: 2021-08-31
Attending: INTERNAL MEDICINE

## 2021-09-02 ENCOUNTER — APPOINTMENT (OUTPATIENT)
Dept: PHYSICAL THERAPY | Facility: HOSPITAL | Age: 76
End: 2021-09-02
Attending: INTERNAL MEDICINE
Payer: MEDICARE

## 2021-09-07 ENCOUNTER — TELEPHONE (OUTPATIENT)
Dept: PHYSICAL THERAPY | Facility: HOSPITAL | Age: 76
End: 2021-09-07

## 2021-09-09 ENCOUNTER — APPOINTMENT (OUTPATIENT)
Dept: PHYSICAL THERAPY | Facility: HOSPITAL | Age: 76
End: 2021-09-09
Attending: INTERNAL MEDICINE
Payer: MEDICARE

## 2021-09-14 ENCOUNTER — APPOINTMENT (OUTPATIENT)
Dept: PHYSICAL THERAPY | Facility: HOSPITAL | Age: 76
End: 2021-09-14
Attending: INTERNAL MEDICINE
Payer: MEDICARE

## 2021-09-16 ENCOUNTER — APPOINTMENT (OUTPATIENT)
Dept: PHYSICAL THERAPY | Facility: HOSPITAL | Age: 76
End: 2021-09-16
Attending: INTERNAL MEDICINE
Payer: MEDICARE

## 2021-09-21 ENCOUNTER — APPOINTMENT (OUTPATIENT)
Dept: PHYSICAL THERAPY | Facility: HOSPITAL | Age: 76
End: 2021-09-21
Attending: INTERNAL MEDICINE
Payer: MEDICARE

## 2021-09-23 ENCOUNTER — APPOINTMENT (OUTPATIENT)
Dept: PHYSICAL THERAPY | Facility: HOSPITAL | Age: 76
End: 2021-09-23
Attending: INTERNAL MEDICINE
Payer: MEDICARE

## 2021-09-28 ENCOUNTER — APPOINTMENT (OUTPATIENT)
Dept: PHYSICAL THERAPY | Facility: HOSPITAL | Age: 76
End: 2021-09-28
Attending: INTERNAL MEDICINE
Payer: MEDICARE

## 2021-10-15 PROBLEM — M47.816 ARTHRITIS OF FACET JOINT OF LUMBAR SPINE: Status: ACTIVE | Noted: 2021-10-15

## 2021-10-15 PROBLEM — M47.816 LUMBAR SPONDYLOSIS: Status: ACTIVE | Noted: 2021-10-15

## 2021-10-26 PROBLEM — M47.816 ARTHRITIS OF FACET JOINT OF LUMBAR SPINE: Status: ACTIVE | Noted: 2021-10-26

## 2021-12-08 ENCOUNTER — APPOINTMENT (OUTPATIENT)
Dept: CT IMAGING | Facility: HOSPITAL | Age: 76
End: 2021-12-08
Attending: EMERGENCY MEDICINE
Payer: MEDICARE

## 2021-12-08 ENCOUNTER — APPOINTMENT (OUTPATIENT)
Dept: GENERAL RADIOLOGY | Facility: HOSPITAL | Age: 76
End: 2021-12-08
Attending: EMERGENCY MEDICINE
Payer: MEDICARE

## 2021-12-08 ENCOUNTER — HOSPITAL ENCOUNTER (OUTPATIENT)
Facility: HOSPITAL | Age: 76
Setting detail: OBSERVATION
Discharge: LEFT BEFORE DISCHARGE | End: 2021-12-08
Attending: EMERGENCY MEDICINE | Admitting: INTERNAL MEDICINE
Payer: MEDICARE

## 2021-12-08 VITALS
DIASTOLIC BLOOD PRESSURE: 76 MMHG | OXYGEN SATURATION: 95 % | BODY MASS INDEX: 23.52 KG/M2 | WEIGHT: 168 LBS | HEIGHT: 71 IN | HEART RATE: 99 BPM | RESPIRATION RATE: 24 BRPM | TEMPERATURE: 98 F | SYSTOLIC BLOOD PRESSURE: 124 MMHG

## 2021-12-08 DIAGNOSIS — J18.9 COMMUNITY ACQUIRED PNEUMONIA, UNSPECIFIED LATERALITY: ICD-10-CM

## 2021-12-08 DIAGNOSIS — R07.9 CHEST PAIN OF UNCERTAIN ETIOLOGY: Primary | ICD-10-CM

## 2021-12-08 PROBLEM — E87.1 HYPONATREMIA: Status: ACTIVE | Noted: 2021-12-08

## 2021-12-08 PROBLEM — R73.9 HYPERGLYCEMIA: Status: ACTIVE | Noted: 2021-12-08

## 2021-12-08 PROBLEM — D72.829 LEUKOCYTOSIS: Status: ACTIVE | Noted: 2021-12-08

## 2021-12-08 PROCEDURE — 99235 HOSP IP/OBS SAME DATE MOD 70: CPT | Performed by: STUDENT IN AN ORGANIZED HEALTH CARE EDUCATION/TRAINING PROGRAM

## 2021-12-08 PROCEDURE — 71250 CT THORAX DX C-: CPT | Performed by: EMERGENCY MEDICINE

## 2021-12-08 PROCEDURE — 71045 X-RAY EXAM CHEST 1 VIEW: CPT | Performed by: EMERGENCY MEDICINE

## 2021-12-08 RX ORDER — CALCIUM CARBONATE 200(500)MG
500 TABLET,CHEWABLE ORAL 3 TIMES DAILY PRN
Status: DISCONTINUED | OUTPATIENT
Start: 2021-12-08 | End: 2021-12-08

## 2021-12-08 RX ORDER — METOCLOPRAMIDE HYDROCHLORIDE 5 MG/ML
10 INJECTION INTRAMUSCULAR; INTRAVENOUS EVERY 8 HOURS PRN
Status: DISCONTINUED | OUTPATIENT
Start: 2021-12-08 | End: 2021-12-08

## 2021-12-08 RX ORDER — ENOXAPARIN SODIUM 100 MG/ML
40 INJECTION SUBCUTANEOUS DAILY
Status: DISCONTINUED | OUTPATIENT
Start: 2021-12-08 | End: 2021-12-08

## 2021-12-08 RX ORDER — ONDANSETRON 2 MG/ML
4 INJECTION INTRAMUSCULAR; INTRAVENOUS EVERY 6 HOURS PRN
Status: DISCONTINUED | OUTPATIENT
Start: 2021-12-08 | End: 2021-12-08

## 2021-12-08 RX ORDER — ACETAMINOPHEN 325 MG/1
650 TABLET ORAL EVERY 6 HOURS PRN
Status: DISCONTINUED | OUTPATIENT
Start: 2021-12-08 | End: 2021-12-08

## 2021-12-08 RX ORDER — ASPIRIN 81 MG/1
324 TABLET, CHEWABLE ORAL ONCE
Status: DISCONTINUED | OUTPATIENT
Start: 2021-12-08 | End: 2021-12-08

## 2021-12-08 NOTE — ED QUICK NOTES
Orders for admission, patient is aware of plan and ready to go upstairs. Any questions, please call ED RN [address] [facility] at [address] [facility].      Patient Covid vaccination status: Unvaccinated     COVID Test Ordered in ED: Rapid/Alinity/Respiratory Flu Expanded P

## 2021-12-08 NOTE — ED PROVIDER NOTES
Patient Seen in: [facility] Emergency Department      History   Patient presents with:  Difficulty Breathing    Stated Complaint: ZACH, 90% RA    Subjective:   HPI  This is a 75-year-old man history of coronary artery disease, hypertension, here for gentleman sitting up in the bed in no acute distress  Head: Normocephalic and atraumatic. HEENT:  Mucous membranes are moist.   Cardiovascular:  Normal rate and regular rhythm. No Edema  Pulmonary:  Pulmonary effort is normal.  Normal breath sounds.  No (CPT=71250)  COMPARISON:  [facility] , XR, XR CHEST AP PORTABLE  (CPT=71045), 12/08/2021, 7:03 AM.  INDICATIONS:  ZACH, 90% RA  TECHNIQUE:  Unenhanced multislice CT scanning is performed through the chest.  Dose reduction techniques were used.  Dose information 12/8/2021  PROCEDURE:  XR CHEST AP PORTABLE  (CPT=71045)  TECHNIQUE:  AP chest radiograph was obtained. COMPARISON:  None.   INDICATIONS:  ZACH, 90% RA  PATIENT STATED HISTORY: (As transcribed by Technologist)  Patient shares he has had worsening SOB for 1

## 2021-12-08 NOTE — H&P
[facility]ITAL[facility]  History and Physical     Damaris Halle Patient Status:  Observation    10/7/1945 MRN [id number]   [facility] 5NW-A Attending Berta Rios MD   Hosp Day # 0 PCP MARILEE BOWMAN     Chief Complaint: chest pain     History Rfl: 0  desvenlafaxine ER 25 MG Oral Tablet 24 Hr, Take 2 tabs daily x 1 week, then 1 tab daily x 1 week, then stop., Disp: 21 tablet, Rfl: 0  atorvastatin 20 MG Oral Tab, Take 20 mg by mouth daily.   , Disp: , Rfl:   Montelukast Sodium 10 MG Oral Tab, Take for input(s): CK in the last 168 hours. Inflammatory Markers  Recent Labs   Lab 12/08/21  0630   DDIMER 0.37       Imaging: Imaging data reviewed in Epic. ASSESSMENT / PLAN:     1. Chest pain  2. Suspected PNA on chest imaging  3.  Pt wanting to ian

## 2022-10-15 ENCOUNTER — HOSPITAL ENCOUNTER (EMERGENCY)
Facility: HOSPITAL | Age: 77
Discharge: LEFT BEFORE DISCHARGE | End: 2022-10-15
Attending: EMERGENCY MEDICINE
Payer: MEDICARE

## 2022-10-15 ENCOUNTER — APPOINTMENT (OUTPATIENT)
Dept: GENERAL RADIOLOGY | Facility: HOSPITAL | Age: 77
End: 2022-10-15
Attending: EMERGENCY MEDICINE
Payer: MEDICARE

## 2022-10-15 VITALS
OXYGEN SATURATION: 99 % | HEIGHT: 71 IN | RESPIRATION RATE: 16 BRPM | DIASTOLIC BLOOD PRESSURE: 65 MMHG | TEMPERATURE: 98 F | BODY MASS INDEX: 21.7 KG/M2 | SYSTOLIC BLOOD PRESSURE: 109 MMHG | WEIGHT: 155 LBS | HEART RATE: 77 BPM

## 2022-10-15 DIAGNOSIS — Z53.29 LEFT AGAINST MEDICAL ADVICE: ICD-10-CM

## 2022-10-15 DIAGNOSIS — R07.9 CHEST PAIN, RULE OUT ACUTE MYOCARDIAL INFARCTION: Primary | ICD-10-CM

## 2022-10-15 LAB
ALBUMIN SERPL-MCNC: 3.4 G/DL (ref 3.4–5)
ALBUMIN/GLOB SERPL: 0.9 {RATIO} (ref 1–2)
ALP LIVER SERPL-CCNC: 53 U/L
ALT SERPL-CCNC: 33 U/L
ANION GAP SERPL CALC-SCNC: 5 MMOL/L (ref 0–18)
AST SERPL-CCNC: 14 U/L (ref 15–37)
BASOPHILS # BLD AUTO: 0.03 X10(3) UL (ref 0–0.2)
BASOPHILS NFR BLD AUTO: 0.2 %
BILIRUB SERPL-MCNC: 1 MG/DL (ref 0.1–2)
BUN BLD-MCNC: 15 MG/DL (ref 7–18)
CALCIUM BLD-MCNC: 9.2 MG/DL (ref 8.5–10.1)
CHLORIDE SERPL-SCNC: 99 MMOL/L (ref 98–112)
CO2 SERPL-SCNC: 28 MMOL/L (ref 21–32)
CREAT BLD-MCNC: 0.95 MG/DL
D DIMER PPP FEU-MCNC: 0.38 UG/ML FEU (ref ?–0.79)
EOSINOPHIL # BLD AUTO: 0.11 X10(3) UL (ref 0–0.7)
EOSINOPHIL NFR BLD AUTO: 0.9 %
ERYTHROCYTE [DISTWIDTH] IN BLOOD BY AUTOMATED COUNT: 13.6 %
GFR SERPLBLD BASED ON 1.73 SQ M-ARVRAT: 81 ML/MIN/1.73M2 (ref 60–?)
GLOBULIN PLAS-MCNC: 3.8 G/DL (ref 2.8–4.4)
GLUCOSE BLD-MCNC: 125 MG/DL (ref 70–99)
HCT VFR BLD AUTO: 45.8 %
HGB BLD-MCNC: 15.5 G/DL
IMM GRANULOCYTES # BLD AUTO: 0.06 X10(3) UL (ref 0–1)
IMM GRANULOCYTES NFR BLD: 0.5 %
LYMPHOCYTES # BLD AUTO: 0.94 X10(3) UL (ref 1–4)
LYMPHOCYTES NFR BLD AUTO: 7.6 %
MCH RBC QN AUTO: 29.8 PG (ref 26–34)
MCHC RBC AUTO-ENTMCNC: 33.8 G/DL (ref 31–37)
MCV RBC AUTO: 87.9 FL
MONOCYTES # BLD AUTO: 1.39 X10(3) UL (ref 0.1–1)
MONOCYTES NFR BLD AUTO: 11.3 %
NEUTROPHILS # BLD AUTO: 9.77 X10 (3) UL (ref 1.5–7.7)
NEUTROPHILS # BLD AUTO: 9.77 X10(3) UL (ref 1.5–7.7)
NEUTROPHILS NFR BLD AUTO: 79.5 %
NT-PROBNP SERPL-MCNC: 356 PG/ML (ref ?–450)
OSMOLALITY SERPL CALC.SUM OF ELEC: 276 MOSM/KG (ref 275–295)
PLATELET # BLD AUTO: 318 10(3)UL (ref 150–450)
POTASSIUM SERPL-SCNC: 4.6 MMOL/L (ref 3.5–5.1)
PROT SERPL-MCNC: 7.2 G/DL (ref 6.4–8.2)
RBC # BLD AUTO: 5.21 X10(6)UL
SARS-COV-2 RNA RESP QL NAA+PROBE: NOT DETECTED
SODIUM SERPL-SCNC: 132 MMOL/L (ref 136–145)
TROPONIN I HIGH SENSITIVITY: 11 NG/L
WBC # BLD AUTO: 12.3 X10(3) UL (ref 4–11)

## 2022-10-15 PROCEDURE — 93010 ELECTROCARDIOGRAM REPORT: CPT

## 2022-10-15 PROCEDURE — 83880 ASSAY OF NATRIURETIC PEPTIDE: CPT | Performed by: EMERGENCY MEDICINE

## 2022-10-15 PROCEDURE — 85379 FIBRIN DEGRADATION QUANT: CPT | Performed by: EMERGENCY MEDICINE

## 2022-10-15 PROCEDURE — 80053 COMPREHEN METABOLIC PANEL: CPT | Performed by: EMERGENCY MEDICINE

## 2022-10-15 PROCEDURE — 85025 COMPLETE CBC W/AUTO DIFF WBC: CPT | Performed by: EMERGENCY MEDICINE

## 2022-10-15 PROCEDURE — 99284 EMERGENCY DEPT VISIT MOD MDM: CPT

## 2022-10-15 PROCEDURE — 84484 ASSAY OF TROPONIN QUANT: CPT | Performed by: EMERGENCY MEDICINE

## 2022-10-15 PROCEDURE — 36415 COLL VENOUS BLD VENIPUNCTURE: CPT

## 2022-10-15 PROCEDURE — 71045 X-RAY EXAM CHEST 1 VIEW: CPT | Performed by: EMERGENCY MEDICINE

## 2022-10-15 PROCEDURE — 93005 ELECTROCARDIOGRAM TRACING: CPT

## 2022-10-15 RX ORDER — ONDANSETRON 2 MG/ML
4 INJECTION INTRAMUSCULAR; INTRAVENOUS EVERY 4 HOURS PRN
OUTPATIENT
Start: 2022-10-15 | End: 2022-10-15

## 2022-10-15 RX ORDER — ASPIRIN 81 MG/1
324 TABLET, CHEWABLE ORAL ONCE
Status: COMPLETED | OUTPATIENT
Start: 2022-10-15 | End: 2022-10-15

## 2022-10-15 NOTE — ED QUICK NOTES
PT states that he will \"not stay in the hospital\". PT states that , when he is not at home, his \"anxiety level is out of control\". PT states that he would like to leave AMA. Dr. Andrea Melo notified.

## 2022-10-15 NOTE — ED INITIAL ASSESSMENT (HPI)
Pt here with renetta and chest congestion, productive cough with white phlegm, c/o mid-sternal chest pain. Pt also reports weakness and feeling off balance.

## 2022-10-17 LAB
ATRIAL RATE: 83 BPM
P AXIS: 19 DEGREES
P-R INTERVAL: 180 MS
Q-T INTERVAL: 388 MS
QRS DURATION: 130 MS
QTC CALCULATION (BEZET): 455 MS
R AXIS: 49 DEGREES
T AXIS: 40 DEGREES
VENTRICULAR RATE: 83 BPM

## 2022-10-19 ENCOUNTER — OFFICE VISIT (OUTPATIENT)
Dept: FAMILY MEDICINE CLINIC | Facility: CLINIC | Age: 77
End: 2022-10-19
Payer: MEDICARE

## 2022-10-19 DIAGNOSIS — H61.23 IMPACTED CERUMEN OF BOTH EARS: Primary | ICD-10-CM

## 2022-10-19 PROCEDURE — 69209 REMOVE IMPACTED EAR WAX UNI: CPT | Performed by: NURSE PRACTITIONER

## 2022-10-19 NOTE — PROGRESS NOTES
Patient presents with:    Request for Ear Wax Removal    HPI: Patient presents for removal of ear wax. Has ongoing issue with cerumen build up. Home treatments tried: none. Reports diminished hearing, itching. Denies ear pain, fever, chills. Cerumen Removal Procedure  Patient gave verbal consent. Risks and Benefits of removal were discussed with the patient. Pt agreed to proceed with procedure. Location: Right ear/Left ear  Indication: TM not visible, local itching, fullness. Prep: Hydrogen Peroxide with warm water via syringe  Removal: Otoscope visualization of cerumen and ear lavage were used to remove the wax  Patient Status: Tolerated well; No complications      EXAM: Prior to cerumen removal:  Right and Left EAC with cerumen impaction; b/l TM not visible. No tragal tenderness on palpation. After removal: EACs healthy and without lesions. TMs healthy, no injection, fluid, retraction. Assessment:  Cerumen impaction of right and left ear    Plan: Successful removal with ear lavage of left ear. Pt will do debrox and rtc for right ear flush. Patient tolerated without complications. Discussed prevention of impaction. F/U as needed.

## 2022-10-21 ENCOUNTER — OFFICE VISIT (OUTPATIENT)
Dept: FAMILY MEDICINE CLINIC | Facility: CLINIC | Age: 77
End: 2022-10-21
Payer: MEDICARE

## 2022-10-21 DIAGNOSIS — H61.21 IMPACTED CERUMEN OF RIGHT EAR: Primary | ICD-10-CM

## 2022-10-21 PROCEDURE — 69209 REMOVE IMPACTED EAR WAX UNI: CPT | Performed by: NURSE PRACTITIONER

## 2022-10-21 NOTE — PROGRESS NOTES
Patient presents with:  Cerumen Impaction      HPI:     Hilario Moncada is a 76year old male presents for ear wax removal. Patient denies any ear pain. Was seen in clinic a few days ago and had cerumen removed form left ear, has been using debrox in right ear at home and was advised to return today for cerumen removal of right ear    ROS:   Pertinent positives cerumen impaction of right ear      Physical Exam:    Right EAC obstructed with cerumen. Cerumen removed via irrigation. EAC clear. TM's gray. Left EAC clear. Left Tm gray, no bulging, no fluid. MDM/Assessment/Plan:       Diagnosis:    ICD-10-CM    1. Impacted cerumen of right ear  H61.21        Patient tolerated ear irrigation well. To follow up as needed.

## 2023-04-14 ENCOUNTER — OFFICE VISIT (OUTPATIENT)
Dept: FAMILY MEDICINE CLINIC | Facility: CLINIC | Age: 78
End: 2023-04-14
Payer: MEDICARE

## 2023-04-14 DIAGNOSIS — H61.21 EXCESSIVE CERUMEN IN RIGHT EAR CANAL: Primary | ICD-10-CM

## 2023-04-14 PROCEDURE — 69209 REMOVE IMPACTED EAR WAX UNI: CPT | Performed by: PHYSICIAN ASSISTANT

## 2023-06-22 ENCOUNTER — HOSPITAL ENCOUNTER (EMERGENCY)
Facility: HOSPITAL | Age: 78
Discharge: HOME | End: 2023-06-22
Attending: EMERGENCY MEDICINE
Payer: MEDICARE

## 2023-06-22 ENCOUNTER — APPOINTMENT (OUTPATIENT)
Dept: CT IMAGING | Facility: HOSPITAL | Age: 78
End: 2023-06-22
Attending: EMERGENCY MEDICINE
Payer: MEDICARE

## 2023-06-22 VITALS
WEIGHT: 162 LBS | RESPIRATION RATE: 16 BRPM | SYSTOLIC BLOOD PRESSURE: 132 MMHG | DIASTOLIC BLOOD PRESSURE: 86 MMHG | OXYGEN SATURATION: 97 % | TEMPERATURE: 97 F | HEART RATE: 67 BPM | BODY MASS INDEX: 22.68 KG/M2 | HEIGHT: 71 IN

## 2023-06-22 DIAGNOSIS — T16.2XXA FOREIGN BODY OF LEFT MIDDLE EAR, INITIAL ENCOUNTER: ICD-10-CM

## 2023-06-22 DIAGNOSIS — R53.1 WEAKNESS GENERALIZED: Primary | ICD-10-CM

## 2023-06-22 LAB
ALBUMIN SERPL-MCNC: 3.3 G/DL (ref 3.4–5)
ALBUMIN/GLOB SERPL: 0.9 {RATIO} (ref 1–2)
ALP LIVER SERPL-CCNC: 55 U/L
ALT SERPL-CCNC: 33 U/L
ANION GAP SERPL CALC-SCNC: 5 MMOL/L (ref 0–18)
AST SERPL-CCNC: 22 U/L (ref 15–37)
BASOPHILS # BLD AUTO: 0.07 X10(3) UL (ref 0–0.2)
BASOPHILS NFR BLD AUTO: 0.7 %
BILIRUB SERPL-MCNC: 0.7 MG/DL (ref 0.1–2)
BILIRUB UR QL STRIP.AUTO: NEGATIVE
BUN BLD-MCNC: 15 MG/DL (ref 7–18)
CALCIUM BLD-MCNC: 9.1 MG/DL (ref 8.5–10.1)
CHLORIDE SERPL-SCNC: 101 MMOL/L (ref 98–112)
CLARITY UR REFRACT.AUTO: CLEAR
CO2 SERPL-SCNC: 28 MMOL/L (ref 21–32)
COLOR UR AUTO: YELLOW
CREAT BLD-MCNC: 0.77 MG/DL
EOSINOPHIL # BLD AUTO: 0.44 X10(3) UL (ref 0–0.7)
EOSINOPHIL NFR BLD AUTO: 4.3 %
ERYTHROCYTE [DISTWIDTH] IN BLOOD BY AUTOMATED COUNT: 13.8 %
GFR SERPLBLD BASED ON 1.73 SQ M-ARVRAT: 91 ML/MIN/1.73M2 (ref 60–?)
GLOBULIN PLAS-MCNC: 3.5 G/DL (ref 2.8–4.4)
GLUCOSE BLD-MCNC: 96 MG/DL (ref 70–99)
GLUCOSE UR STRIP.AUTO-MCNC: NEGATIVE MG/DL
HCT VFR BLD AUTO: 40.7 %
HGB BLD-MCNC: 13.6 G/DL
IMM GRANULOCYTES # BLD AUTO: 0.05 X10(3) UL (ref 0–1)
IMM GRANULOCYTES NFR BLD: 0.5 %
KETONES UR STRIP.AUTO-MCNC: NEGATIVE MG/DL
LEUKOCYTE ESTERASE UR QL STRIP.AUTO: NEGATIVE
LYMPHOCYTES # BLD AUTO: 1.68 X10(3) UL (ref 1–4)
LYMPHOCYTES NFR BLD AUTO: 16.3 %
MCH RBC QN AUTO: 29 PG (ref 26–34)
MCHC RBC AUTO-ENTMCNC: 33.4 G/DL (ref 31–37)
MCV RBC AUTO: 86.8 FL
MONOCYTES # BLD AUTO: 1.47 X10(3) UL (ref 0.1–1)
MONOCYTES NFR BLD AUTO: 14.3 %
NEUTROPHILS # BLD AUTO: 6.59 X10 (3) UL (ref 1.5–7.7)
NEUTROPHILS # BLD AUTO: 6.59 X10(3) UL (ref 1.5–7.7)
NEUTROPHILS NFR BLD AUTO: 63.9 %
NITRITE UR QL STRIP.AUTO: NEGATIVE
OSMOLALITY SERPL CALC.SUM OF ELEC: 279 MOSM/KG (ref 275–295)
PH UR STRIP.AUTO: 6 [PH] (ref 5–8)
PLATELET # BLD AUTO: 265 10(3)UL (ref 150–450)
POTASSIUM SERPL-SCNC: 4.2 MMOL/L (ref 3.5–5.1)
PROT SERPL-MCNC: 6.8 G/DL (ref 6.4–8.2)
PROT UR STRIP.AUTO-MCNC: NEGATIVE MG/DL
RBC # BLD AUTO: 4.69 X10(6)UL
RBC UR QL AUTO: NEGATIVE
SODIUM SERPL-SCNC: 134 MMOL/L (ref 136–145)
SP GR UR STRIP.AUTO: 1.01 (ref 1–1.03)
TROPONIN I HIGH SENSITIVITY: 12 NG/L
UROBILINOGEN UR STRIP.AUTO-MCNC: <2 MG/DL
WBC # BLD AUTO: 10.3 X10(3) UL (ref 4–11)

## 2023-06-22 PROCEDURE — 96360 HYDRATION IV INFUSION INIT: CPT

## 2023-06-22 PROCEDURE — 85025 COMPLETE CBC W/AUTO DIFF WBC: CPT | Performed by: EMERGENCY MEDICINE

## 2023-06-22 PROCEDURE — 93010 ELECTROCARDIOGRAM REPORT: CPT

## 2023-06-22 PROCEDURE — 93005 ELECTROCARDIOGRAM TRACING: CPT

## 2023-06-22 PROCEDURE — 96361 HYDRATE IV INFUSION ADD-ON: CPT

## 2023-06-22 PROCEDURE — 70450 CT HEAD/BRAIN W/O DYE: CPT | Performed by: EMERGENCY MEDICINE

## 2023-06-22 PROCEDURE — 84484 ASSAY OF TROPONIN QUANT: CPT | Performed by: EMERGENCY MEDICINE

## 2023-06-22 PROCEDURE — 99285 EMERGENCY DEPT VISIT HI MDM: CPT

## 2023-06-22 PROCEDURE — 99284 EMERGENCY DEPT VISIT MOD MDM: CPT

## 2023-06-22 PROCEDURE — 80053 COMPREHEN METABOLIC PANEL: CPT | Performed by: EMERGENCY MEDICINE

## 2023-06-22 PROCEDURE — 81003 URINALYSIS AUTO W/O SCOPE: CPT | Performed by: EMERGENCY MEDICINE

## 2023-06-23 LAB
ATRIAL RATE: 64 BPM
P AXIS: 33 DEGREES
P-R INTERVAL: 202 MS
Q-T INTERVAL: 428 MS
QRS DURATION: 130 MS
QTC CALCULATION (BEZET): 441 MS
R AXIS: 19 DEGREES
T AXIS: -8 DEGREES
VENTRICULAR RATE: 64 BPM

## 2023-06-23 NOTE — DISCHARGE INSTRUCTIONS
Return for new or worsening symptoms such as fever, shortness of breath, increased weakness or fainting

## 2023-06-23 NOTE — ED INITIAL ASSESSMENT (HPI)
Per EMS, pt only had a half a glass of water today and called EMS for dehydration. Pt reports he feels weak x a few months, reports he came to the ER today because he was \"so weak\". Pt also reports he has a hearing aid stuck in his left ear for a few weeks. Pt is a&ox4 and from home. Reports he normally walks with a walker/cane. Skin pink, warm, dry.

## 2023-07-23 ENCOUNTER — HOSPITAL ENCOUNTER (EMERGENCY)
Facility: HOSPITAL | Age: 78
Discharge: HOME | End: 2023-07-23
Attending: EMERGENCY MEDICINE
Payer: MEDICARE

## 2023-07-23 VITALS
SYSTOLIC BLOOD PRESSURE: 130 MMHG | RESPIRATION RATE: 16 BRPM | BODY MASS INDEX: 22.26 KG/M2 | TEMPERATURE: 98 F | HEIGHT: 71 IN | HEART RATE: 81 BPM | DIASTOLIC BLOOD PRESSURE: 72 MMHG | OXYGEN SATURATION: 97 % | WEIGHT: 159 LBS

## 2023-07-23 DIAGNOSIS — R42 ORTHOSTATIC DIZZINESS: Primary | ICD-10-CM

## 2023-07-23 LAB
ALBUMIN SERPL-MCNC: 3.3 G/DL (ref 3.4–5)
ALBUMIN/GLOB SERPL: 0.9 {RATIO} (ref 1–2)
ALP LIVER SERPL-CCNC: 51 U/L
ALT SERPL-CCNC: 32 U/L
ANION GAP SERPL CALC-SCNC: 6 MMOL/L (ref 0–18)
AST SERPL-CCNC: 28 U/L (ref 15–37)
ATRIAL RATE: 74 BPM
BASOPHILS # BLD AUTO: 0.05 X10(3) UL (ref 0–0.2)
BASOPHILS NFR BLD AUTO: 0.7 %
BILIRUB SERPL-MCNC: 0.7 MG/DL (ref 0.1–2)
BUN BLD-MCNC: 10 MG/DL (ref 7–18)
CALCIUM BLD-MCNC: 8.9 MG/DL (ref 8.5–10.1)
CHLORIDE SERPL-SCNC: 103 MMOL/L (ref 98–112)
CO2 SERPL-SCNC: 25 MMOL/L (ref 21–32)
CREAT BLD-MCNC: 0.87 MG/DL
EGFRCR SERPLBLD CKD-EPI 2021: 88 ML/MIN/1.73M2 (ref 60–?)
EOSINOPHIL # BLD AUTO: 0.39 X10(3) UL (ref 0–0.7)
EOSINOPHIL NFR BLD AUTO: 5.1 %
ERYTHROCYTE [DISTWIDTH] IN BLOOD BY AUTOMATED COUNT: 13.7 %
GLOBULIN PLAS-MCNC: 3.6 G/DL (ref 2.8–4.4)
GLUCOSE BLD-MCNC: 109 MG/DL (ref 70–99)
HCT VFR BLD AUTO: 41.6 %
HGB BLD-MCNC: 13.8 G/DL
IMM GRANULOCYTES # BLD AUTO: 0.03 X10(3) UL (ref 0–1)
IMM GRANULOCYTES NFR BLD: 0.4 %
LYMPHOCYTES # BLD AUTO: 1.25 X10(3) UL (ref 1–4)
LYMPHOCYTES NFR BLD AUTO: 16.5 %
MCH RBC QN AUTO: 29 PG (ref 26–34)
MCHC RBC AUTO-ENTMCNC: 33.2 G/DL (ref 31–37)
MCV RBC AUTO: 87.4 FL
MONOCYTES # BLD AUTO: 1.11 X10(3) UL (ref 0.1–1)
MONOCYTES NFR BLD AUTO: 14.6 %
NEUTROPHILS # BLD AUTO: 4.75 X10 (3) UL (ref 1.5–7.7)
NEUTROPHILS # BLD AUTO: 4.75 X10(3) UL (ref 1.5–7.7)
NEUTROPHILS NFR BLD AUTO: 62.7 %
OSMOLALITY SERPL CALC.SUM OF ELEC: 278 MOSM/KG (ref 275–295)
P AXIS: 8 DEGREES
P-R INTERVAL: 186 MS
PLATELET # BLD AUTO: 264 10(3)UL (ref 150–450)
POTASSIUM SERPL-SCNC: 4.4 MMOL/L (ref 3.5–5.1)
PROT SERPL-MCNC: 6.9 G/DL (ref 6.4–8.2)
Q-T INTERVAL: 412 MS
QRS DURATION: 130 MS
QTC CALCULATION (BEZET): 457 MS
R AXIS: 45 DEGREES
RBC # BLD AUTO: 4.76 X10(6)UL
SODIUM SERPL-SCNC: 134 MMOL/L (ref 136–145)
T AXIS: -18 DEGREES
TROPONIN I HIGH SENSITIVITY: 12 NG/L
VENTRICULAR RATE: 74 BPM
WBC # BLD AUTO: 7.6 X10(3) UL (ref 4–11)

## 2023-07-23 PROCEDURE — 84484 ASSAY OF TROPONIN QUANT: CPT | Performed by: EMERGENCY MEDICINE

## 2023-07-23 PROCEDURE — 93010 ELECTROCARDIOGRAM REPORT: CPT

## 2023-07-23 PROCEDURE — 99284 EMERGENCY DEPT VISIT MOD MDM: CPT

## 2023-07-23 PROCEDURE — 93005 ELECTROCARDIOGRAM TRACING: CPT

## 2023-07-23 PROCEDURE — 80053 COMPREHEN METABOLIC PANEL: CPT | Performed by: EMERGENCY MEDICINE

## 2023-07-23 PROCEDURE — 85025 COMPLETE CBC W/AUTO DIFF WBC: CPT | Performed by: EMERGENCY MEDICINE

## 2023-07-23 PROCEDURE — 96360 HYDRATION IV INFUSION INIT: CPT

## 2023-07-23 NOTE — ED INITIAL ASSESSMENT (HPI)
Pt walk in to ED c/o dizziness for the past three days. Friend at bedside reports he went to stand up yesterday after waking up and fell because he felt dizzy. A&Ox4. Denies vision changes and pain.

## 2023-11-07 ENCOUNTER — APPOINTMENT (OUTPATIENT)
Dept: CT IMAGING | Facility: HOSPITAL | Age: 78
End: 2023-11-07
Attending: EMERGENCY MEDICINE
Payer: MEDICARE

## 2023-11-07 ENCOUNTER — APPOINTMENT (OUTPATIENT)
Dept: MRI IMAGING | Facility: HOSPITAL | Age: 78
End: 2023-11-07
Attending: EMERGENCY MEDICINE
Payer: MEDICARE

## 2023-11-07 ENCOUNTER — HOSPITAL ENCOUNTER (INPATIENT)
Facility: HOSPITAL | Age: 78
LOS: 1 days | Discharge: NURSING, REHAB OR LONG-TERM CARE | End: 2023-11-09
Attending: EMERGENCY MEDICINE | Admitting: HOSPITALIST
Payer: MEDICARE

## 2023-11-07 ENCOUNTER — APPOINTMENT (OUTPATIENT)
Dept: GENERAL RADIOLOGY | Facility: HOSPITAL | Age: 78
End: 2023-11-07
Attending: EMERGENCY MEDICINE
Payer: MEDICARE

## 2023-11-07 DIAGNOSIS — I63.9 ACUTE CVA (CEREBROVASCULAR ACCIDENT) (HCC): Primary | ICD-10-CM

## 2023-11-07 LAB
ALBUMIN SERPL-MCNC: 3.3 G/DL (ref 3.4–5)
ALBUMIN/GLOB SERPL: 0.8 {RATIO} (ref 1–2)
ALP LIVER SERPL-CCNC: 74 U/L
ALT SERPL-CCNC: 26 U/L
ANION GAP SERPL CALC-SCNC: 6 MMOL/L (ref 0–18)
APTT PPP: 33.8 SECONDS (ref 23.3–35.6)
AST SERPL-CCNC: 14 U/L (ref 15–37)
ATRIAL RATE: 69 BPM
BASOPHILS # BLD AUTO: 0.05 X10(3) UL (ref 0–0.2)
BASOPHILS NFR BLD AUTO: 0.6 %
BILIRUB SERPL-MCNC: 0.6 MG/DL (ref 0.1–2)
BUN BLD-MCNC: 9 MG/DL (ref 9–23)
CALCIUM BLD-MCNC: 9 MG/DL (ref 8.5–10.1)
CHLORIDE SERPL-SCNC: 102 MMOL/L (ref 98–112)
CHOLEST SERPL-MCNC: 108 MG/DL (ref ?–200)
CO2 SERPL-SCNC: 29 MMOL/L (ref 21–32)
CREAT BLD-MCNC: 0.9 MG/DL
EGFRCR SERPLBLD CKD-EPI 2021: 86 ML/MIN/1.73M2 (ref 60–?)
EOSINOPHIL # BLD AUTO: 0.29 X10(3) UL (ref 0–0.7)
EOSINOPHIL NFR BLD AUTO: 3.3 %
ERYTHROCYTE [DISTWIDTH] IN BLOOD BY AUTOMATED COUNT: 13.6 %
GLOBULIN PLAS-MCNC: 4 G/DL (ref 2.8–4.4)
GLUCOSE BLD-MCNC: 107 MG/DL (ref 70–99)
GLUCOSE BLD-MCNC: 96 MG/DL (ref 70–99)
HCT VFR BLD AUTO: 43.9 %
HDLC SERPL-MCNC: 50 MG/DL (ref 40–59)
HGB BLD-MCNC: 14.3 G/DL
IMM GRANULOCYTES # BLD AUTO: 0.02 X10(3) UL (ref 0–1)
IMM GRANULOCYTES NFR BLD: 0.2 %
INR BLD: 1.16 (ref 0.8–1.2)
LDLC SERPL CALC-MCNC: 45 MG/DL (ref ?–100)
LYMPHOCYTES # BLD AUTO: 1.43 X10(3) UL (ref 1–4)
LYMPHOCYTES NFR BLD AUTO: 16.3 %
MCH RBC QN AUTO: 28.5 PG (ref 26–34)
MCHC RBC AUTO-ENTMCNC: 32.6 G/DL (ref 31–37)
MCV RBC AUTO: 87.6 FL
MONOCYTES # BLD AUTO: 0.89 X10(3) UL (ref 0.1–1)
MONOCYTES NFR BLD AUTO: 10.2 %
NEUTROPHILS # BLD AUTO: 6.07 X10 (3) UL (ref 1.5–7.7)
NEUTROPHILS # BLD AUTO: 6.07 X10(3) UL (ref 1.5–7.7)
NEUTROPHILS NFR BLD AUTO: 69.4 %
NONHDLC SERPL-MCNC: 58 MG/DL (ref ?–130)
OSMOLALITY SERPL CALC.SUM OF ELEC: 283 MOSM/KG (ref 275–295)
P AXIS: 47 DEGREES
P-R INTERVAL: 230 MS
PLATELET # BLD AUTO: 272 10(3)UL (ref 150–450)
POTASSIUM SERPL-SCNC: 3.9 MMOL/L (ref 3.5–5.1)
PROT SERPL-MCNC: 7.3 G/DL (ref 6.4–8.2)
PROTHROMBIN TIME: 14.9 SECONDS (ref 11.6–14.8)
Q-T INTERVAL: 354 MS
QRS DURATION: 114 MS
QTC CALCULATION (BEZET): 379 MS
R AXIS: 36 DEGREES
RBC # BLD AUTO: 5.01 X10(6)UL
SODIUM SERPL-SCNC: 137 MMOL/L (ref 136–145)
T AXIS: 29 DEGREES
TRIGL SERPL-MCNC: 57 MG/DL (ref 30–149)
TROPONIN I SERPL HS-MCNC: 12 NG/L
VENTRICULAR RATE: 69 BPM
VLDLC SERPL CALC-MCNC: 8 MG/DL (ref 0–30)
WBC # BLD AUTO: 8.8 X10(3) UL (ref 4–11)

## 2023-11-07 PROCEDURE — 70496 CT ANGIOGRAPHY HEAD: CPT | Performed by: EMERGENCY MEDICINE

## 2023-11-07 PROCEDURE — 70551 MRI BRAIN STEM W/O DYE: CPT | Performed by: EMERGENCY MEDICINE

## 2023-11-07 PROCEDURE — 99497 ADVNCD CARE PLAN 30 MIN: CPT | Performed by: HOSPITALIST

## 2023-11-07 PROCEDURE — 71045 X-RAY EXAM CHEST 1 VIEW: CPT | Performed by: EMERGENCY MEDICINE

## 2023-11-07 PROCEDURE — 70498 CT ANGIOGRAPHY NECK: CPT | Performed by: EMERGENCY MEDICINE

## 2023-11-07 PROCEDURE — 70450 CT HEAD/BRAIN W/O DYE: CPT | Performed by: EMERGENCY MEDICINE

## 2023-11-07 PROCEDURE — 99223 1ST HOSP IP/OBS HIGH 75: CPT | Performed by: HOSPITALIST

## 2023-11-07 RX ORDER — ARIPIPRAZOLE 2 MG/1
2 TABLET ORAL NIGHTLY
COMMUNITY

## 2023-11-07 RX ORDER — ACETAMINOPHEN 650 MG/1
650 SUPPOSITORY RECTAL EVERY 4 HOURS PRN
Status: DISCONTINUED | OUTPATIENT
Start: 2023-11-07 | End: 2023-11-09

## 2023-11-07 RX ORDER — ASPIRIN 81 MG/1
324 TABLET, CHEWABLE ORAL ONCE
Status: COMPLETED | OUTPATIENT
Start: 2023-11-07 | End: 2023-11-07

## 2023-11-07 RX ORDER — DOCUSATE SODIUM 100 MG/1
100 CAPSULE, LIQUID FILLED ORAL DAILY
Status: DISCONTINUED | OUTPATIENT
Start: 2023-11-07 | End: 2023-11-09

## 2023-11-07 RX ORDER — HEPARIN SODIUM 5000 [USP'U]/ML
5000 INJECTION, SOLUTION INTRAVENOUS; SUBCUTANEOUS EVERY 12 HOURS SCHEDULED
Status: DISCONTINUED | OUTPATIENT
Start: 2023-11-07 | End: 2023-11-09

## 2023-11-07 RX ORDER — HEPARIN SODIUM 5000 [USP'U]/ML
5000 INJECTION, SOLUTION INTRAVENOUS; SUBCUTANEOUS EVERY 12 HOURS SCHEDULED
Status: DISCONTINUED | OUTPATIENT
Start: 2023-11-07 | End: 2023-11-07

## 2023-11-07 RX ORDER — MELATONIN
3 NIGHTLY PRN
Status: DISCONTINUED | OUTPATIENT
Start: 2023-11-07 | End: 2023-11-09

## 2023-11-07 RX ORDER — FLUTICASONE FUROATE AND VILANTEROL 100; 25 UG/1; UG/1
1 POWDER RESPIRATORY (INHALATION) DAILY
Status: DISCONTINUED | OUTPATIENT
Start: 2023-11-08 | End: 2023-11-09

## 2023-11-07 RX ORDER — ONDANSETRON 2 MG/ML
4 INJECTION INTRAMUSCULAR; INTRAVENOUS EVERY 6 HOURS PRN
Status: DISCONTINUED | OUTPATIENT
Start: 2023-11-07 | End: 2023-11-07

## 2023-11-07 RX ORDER — METOCLOPRAMIDE HYDROCHLORIDE 5 MG/ML
10 INJECTION INTRAMUSCULAR; INTRAVENOUS EVERY 8 HOURS PRN
Status: DISCONTINUED | OUTPATIENT
Start: 2023-11-07 | End: 2023-11-09

## 2023-11-07 RX ORDER — ACETAMINOPHEN 325 MG/1
650 TABLET ORAL EVERY 4 HOURS PRN
Status: DISCONTINUED | OUTPATIENT
Start: 2023-11-07 | End: 2023-11-09

## 2023-11-07 RX ORDER — PHENYLEPHRINE HCL IN 0.9% NACL 50MG/250ML
PLASTIC BAG, INJECTION (ML) INTRAVENOUS CONTINUOUS PRN
Status: DISCONTINUED | OUTPATIENT
Start: 2023-11-07 | End: 2023-11-09

## 2023-11-07 RX ORDER — ATORVASTATIN CALCIUM 20 MG/1
20 TABLET, FILM COATED ORAL DAILY
Status: DISCONTINUED | OUTPATIENT
Start: 2023-11-07 | End: 2023-11-07

## 2023-11-07 RX ORDER — SENNOSIDES 8.6 MG
17.2 TABLET ORAL NIGHTLY PRN
Status: DISCONTINUED | OUTPATIENT
Start: 2023-11-07 | End: 2023-11-09

## 2023-11-07 RX ORDER — FLUTICASONE PROPIONATE AND SALMETEROL 250; 50 UG/1; UG/1
1 POWDER RESPIRATORY (INHALATION) EVERY 12 HOURS SCHEDULED
COMMUNITY

## 2023-11-07 RX ORDER — LABETALOL HYDROCHLORIDE 5 MG/ML
10 INJECTION, SOLUTION INTRAVENOUS EVERY 10 MIN PRN
Status: DISCONTINUED | OUTPATIENT
Start: 2023-11-07 | End: 2023-11-09

## 2023-11-07 RX ORDER — VIBEGRON 75 MG/1
75 TABLET, FILM COATED ORAL NIGHTLY
COMMUNITY

## 2023-11-07 RX ORDER — SODIUM CHLORIDE 9 MG/ML
1000 INJECTION, SOLUTION INTRAVENOUS ONCE
Status: COMPLETED | OUTPATIENT
Start: 2023-11-07 | End: 2023-11-08

## 2023-11-07 RX ORDER — PAROXETINE 30 MG/1
30 TABLET, FILM COATED ORAL EVERY MORNING
COMMUNITY

## 2023-11-07 RX ORDER — ENEMA 19; 7 G/133ML; G/133ML
1 ENEMA RECTAL ONCE AS NEEDED
Status: DISCONTINUED | OUTPATIENT
Start: 2023-11-07 | End: 2023-11-09

## 2023-11-07 RX ORDER — POLYETHYLENE GLYCOL 3350 17 G/17G
17 POWDER, FOR SOLUTION ORAL DAILY PRN
Status: DISCONTINUED | OUTPATIENT
Start: 2023-11-07 | End: 2023-11-09

## 2023-11-07 RX ORDER — METOCLOPRAMIDE HYDROCHLORIDE 5 MG/ML
10 INJECTION INTRAMUSCULAR; INTRAVENOUS EVERY 8 HOURS PRN
Status: DISCONTINUED | OUTPATIENT
Start: 2023-11-07 | End: 2023-11-07

## 2023-11-07 RX ORDER — HYDRALAZINE HYDROCHLORIDE 20 MG/ML
10 INJECTION INTRAMUSCULAR; INTRAVENOUS EVERY 2 HOUR PRN
Status: DISCONTINUED | OUTPATIENT
Start: 2023-11-07 | End: 2023-11-09

## 2023-11-07 RX ORDER — BISACODYL 10 MG
10 SUPPOSITORY, RECTAL RECTAL
Status: DISCONTINUED | OUTPATIENT
Start: 2023-11-07 | End: 2023-11-09

## 2023-11-07 RX ORDER — ACETAMINOPHEN 500 MG
500 TABLET ORAL EVERY 4 HOURS PRN
Status: DISCONTINUED | OUTPATIENT
Start: 2023-11-07 | End: 2023-11-09

## 2023-11-07 RX ORDER — TRIAMCINOLONE ACETONIDE 55 UG/1
1 SPRAY, METERED NASAL DAILY
COMMUNITY

## 2023-11-07 RX ORDER — ONDANSETRON 2 MG/ML
4 INJECTION INTRAMUSCULAR; INTRAVENOUS EVERY 6 HOURS PRN
Status: DISCONTINUED | OUTPATIENT
Start: 2023-11-07 | End: 2023-11-09

## 2023-11-07 RX ORDER — SODIUM CHLORIDE 9 MG/ML
INJECTION, SOLUTION INTRAVENOUS CONTINUOUS
Status: DISCONTINUED | OUTPATIENT
Start: 2023-11-07 | End: 2023-11-09

## 2023-11-07 RX ORDER — ATORVASTATIN CALCIUM 40 MG/1
40 TABLET, FILM COATED ORAL NIGHTLY
Status: DISCONTINUED | OUTPATIENT
Start: 2023-11-07 | End: 2023-11-08

## 2023-11-07 RX ORDER — ASPIRIN 325 MG
325 TABLET ORAL DAILY
Status: DISCONTINUED | OUTPATIENT
Start: 2023-11-08 | End: 2023-11-09

## 2023-11-07 RX ORDER — ASPIRIN 300 MG/1
300 SUPPOSITORY RECTAL DAILY
Status: DISCONTINUED | OUTPATIENT
Start: 2023-11-08 | End: 2023-11-09

## 2023-11-07 NOTE — ED QUICK NOTES
Family at bedside, per family patient fell this morning around 0730 am. Did not hit head. No LOC. Medics came but patient refused to come to ER. Around 0800, family noticed right arm weakness.      MD Melton @ bedside

## 2023-11-07 NOTE — SIGNIFICANT EVENT
Called to ED for Stroke Alert at 1316  Arrived to department at [address] [facility] [address] [place] [address], already seen by Dr Kia Rios  Last Known Normal at 0700 today  Pre-morbid mRS 2    Initial NIHSS 3 including L sided weakness  Pt accompanied to CT dept  Dr Kathryn Galdamez (Neuro Critical Care) notified at 1325  Repeat NIHSS completed back in ED room    NIH Stroke Scale  1a. Level of consciousness: 0   1b. LOC questions:  0   1c. LOC commands: 0   2. Best Gaze: 0   3. Visual: 0   4. Facial Palsy: 1 (Minor R sided facial droop)   5a. Motor left arm: 0   5b. Motor right arm: 0   6a. Motor left le   6b. Motor right le   7. Limb Ataxia: 0   8. Sensory: 0   9. Best Language:  0   10. Dysarthria: 0   11. Extinction and Inattention: 0     Total:   1      Other symptoms include N/A    Per Dr Kathryn Galdamez is a candidate for TNK,  Case discussed with Dr Kia Rios, ED    Of note: Patient was brought to ED for evaluation of stroke. Patient experienced a fall this morning. Upon arrival to ED, patient had presented with L sided weakness that resolved shortly after CT imaging completion. Please refer to detailed breakdown of NIHSS above.       Krista Tejeda, RN, BSN  Stroke Navigator  [phone]

## 2023-11-07 NOTE — PROGRESS NOTES
Readjusted in bed  Food tray ordered  Friend at bedside   Waiting for MRI  IV fluids infusing see MAR

## 2023-11-07 NOTE — ED INITIAL ASSESSMENT (HPI)
Patient to ER from home via EMS for complaints of right arm weakness. Blood sugar 104 per medics. Stroke scale negative for medics. IV placed by medics en route.

## 2023-11-08 ENCOUNTER — APPOINTMENT (OUTPATIENT)
Dept: CV DIAGNOSTICS | Facility: HOSPITAL | Age: 78
End: 2023-11-08
Attending: INTERNAL MEDICINE
Payer: MEDICARE

## 2023-11-08 PROBLEM — E78.5 HYPERLIPIDEMIA: Status: ACTIVE | Noted: 2023-11-08

## 2023-11-08 PROBLEM — E78.5 HYPERLIPIDEMIA: Status: ACTIVE | Noted: 2023-01-01

## 2023-11-08 PROBLEM — F32.A DEPRESSION: Status: ACTIVE | Noted: 2023-11-08

## 2023-11-08 PROBLEM — F32.A DEPRESSION: Status: ACTIVE | Noted: 2023-01-01

## 2023-11-08 LAB
ALBUMIN SERPL-MCNC: 3 G/DL (ref 3.4–5)
ALBUMIN/GLOB SERPL: 0.9 {RATIO} (ref 1–2)
ALP LIVER SERPL-CCNC: 58 U/L
ALT SERPL-CCNC: 23 U/L
ANION GAP SERPL CALC-SCNC: 6 MMOL/L (ref 0–18)
AST SERPL-CCNC: 18 U/L (ref 15–37)
BASOPHILS # BLD AUTO: 0.05 X10(3) UL (ref 0–0.2)
BASOPHILS NFR BLD AUTO: 0.7 %
BILIRUB SERPL-MCNC: 0.7 MG/DL (ref 0.1–2)
BUN BLD-MCNC: 8 MG/DL (ref 9–23)
CALCIUM BLD-MCNC: 8.5 MG/DL (ref 8.5–10.1)
CHLORIDE SERPL-SCNC: 107 MMOL/L (ref 98–112)
CO2 SERPL-SCNC: 25 MMOL/L (ref 21–32)
CREAT BLD-MCNC: 0.75 MG/DL
EGFRCR SERPLBLD CKD-EPI 2021: 91 ML/MIN/1.73M2 (ref 60–?)
EOSINOPHIL # BLD AUTO: 0.58 X10(3) UL (ref 0–0.7)
EOSINOPHIL NFR BLD AUTO: 7.7 %
ERYTHROCYTE [DISTWIDTH] IN BLOOD BY AUTOMATED COUNT: 13.5 %
EST. AVERAGE GLUCOSE BLD GHB EST-MCNC: 108 MG/DL (ref 68–126)
GLOBULIN PLAS-MCNC: 3.4 G/DL (ref 2.8–4.4)
GLUCOSE BLD-MCNC: 115 MG/DL (ref 70–99)
GLUCOSE BLD-MCNC: 116 MG/DL (ref 70–99)
GLUCOSE BLD-MCNC: 124 MG/DL (ref 70–99)
GLUCOSE BLD-MCNC: 125 MG/DL (ref 70–99)
GLUCOSE BLD-MCNC: 93 MG/DL (ref 70–99)
HBA1C MFR BLD: 5.4 % (ref ?–5.7)
HCT VFR BLD AUTO: 39 %
HGB BLD-MCNC: 13 G/DL
IMM GRANULOCYTES # BLD AUTO: 0.02 X10(3) UL (ref 0–1)
IMM GRANULOCYTES NFR BLD: 0.3 %
LYMPHOCYTES # BLD AUTO: 1.2 X10(3) UL (ref 1–4)
LYMPHOCYTES NFR BLD AUTO: 15.9 %
MAGNESIUM SERPL-MCNC: 1.9 MG/DL (ref 1.6–2.6)
MCH RBC QN AUTO: 29 PG (ref 26–34)
MCHC RBC AUTO-ENTMCNC: 33.3 G/DL (ref 31–37)
MCV RBC AUTO: 87.1 FL
MONOCYTES # BLD AUTO: 0.99 X10(3) UL (ref 0.1–1)
MONOCYTES NFR BLD AUTO: 13.1 %
NEUTROPHILS # BLD AUTO: 4.71 X10 (3) UL (ref 1.5–7.7)
NEUTROPHILS # BLD AUTO: 4.71 X10(3) UL (ref 1.5–7.7)
NEUTROPHILS NFR BLD AUTO: 62.3 %
OSMOLALITY SERPL CALC.SUM OF ELEC: 284 MOSM/KG (ref 275–295)
PLATELET # BLD AUTO: 248 10(3)UL (ref 150–450)
POTASSIUM SERPL-SCNC: 3.9 MMOL/L (ref 3.5–5.1)
PROT SERPL-MCNC: 6.4 G/DL (ref 6.4–8.2)
RBC # BLD AUTO: 4.48 X10(6)UL
SODIUM SERPL-SCNC: 138 MMOL/L (ref 136–145)
WBC # BLD AUTO: 7.6 X10(3) UL (ref 4–11)

## 2023-11-08 PROCEDURE — 99232 SBSQ HOSP IP/OBS MODERATE 35: CPT | Performed by: INTERNAL MEDICINE

## 2023-11-08 PROCEDURE — 93306 TTE W/DOPPLER COMPLETE: CPT | Performed by: INTERNAL MEDICINE

## 2023-11-08 PROCEDURE — 99223 1ST HOSP IP/OBS HIGH 75: CPT | Performed by: INTERNAL MEDICINE

## 2023-11-08 RX ORDER — ATORVASTATIN CALCIUM 20 MG/1
20 TABLET, FILM COATED ORAL NIGHTLY
Status: DISCONTINUED | OUTPATIENT
Start: 2023-11-08 | End: 2023-11-09

## 2023-11-08 NOTE — PLAN OF CARE
NURSING ADMISSION NOTE      Patient admitted via Cart  Oriented to room. Safety precautions initiated. Bed in low position. Call light in reach. Alert and oriented to place, his name, , forgetful, has Dementia. Room air, clear lungs. On IV fluids. Uses urinal w/ assist. Neuros q 2 hrs x 8. Has right side weakness, right facial droop. [place] DeHe. Has dentures. Can be anxious. Electrolyte protocol. [facility] chaim- 107,116 this am. PT/OT/Speech eval and treat this am. Med rec verified w/ Joie Diaz.      Problem: CARDIOVASCULAR - ADULT  Goal: Absence of cardiac arrhythmias or at baseline  Description: INTERVENTIONS:  - Continuous cardiac monitoring, monitor vital signs, obtain 12 lead EKG if indicated  - Evaluate effectiveness of antiarrhythmic and heart rate control medications as ordered  - Initiate emergency measures for life threatening arrhythmias  - Monitor electrolytes and administer replacement therapy as ordered  Outcome: Progressing     Problem: METABOLIC/FLUID AND ELECTROLYTES - ADULT  Goal: Electrolytes maintained within normal limits  Description: INTERVENTIONS:  - Monitor labs and rhythm and assess patient for signs and symptoms of electrolyte imbalances  - Administer electrolyte replacement as ordered  - Monitor response to electrolyte replacements, including rhythm and repeat lab results as appropriate  - Fluid restriction as ordered  - Instruct patient on fluid and nutrition restrictions as appropriate  Outcome: Progressing  Goal: Hemodynamic stability and optimal renal function maintained  Description: INTERVENTIONS:  - Monitor labs and assess for signs and symptoms of volume excess or deficit  - Monitor intake, output and patient weight  - Monitor urine specific gravity, serum osmolarity and serum sodium as indicated or ordered  - Monitor response to interventions for patient's volume status, including labs, urine output, blood pressure (other measures as available)  - Encourage oral intake as appropriate  - Instruct patient on fluid and nutrition restrictions as appropriate  Outcome: Progressing     Problem: NEUROLOGICAL - ADULT  Goal: Achieves stable or improved neurological status  Description: INTERVENTIONS  - Assess for and report changes in neurological status  - Initiate measures to prevent increased intracranial pressure  - Maintain blood pressure and fluid volume within ordered parameters to optimize cerebral perfusion and minimize risk of hemorrhage  - Monitor temperature, glucose, and sodium. Initiate appropriate interventions as ordered  Outcome: Progressing  Goal: Achieves maximal functionality and self care  Description: INTERVENTIONS  - Monitor swallowing and airway patency with patient fatigue and changes in neurological status  - Encourage and assist patient to increase activity and self care with guidance from PT/OT  - Encourage visually impaired, hearing impaired and aphasic patients to use assistive/communication devices  Outcome: Progressing     Problem: Impaired Functional Mobility  Goal: Achieve highest/safest level of mobility/gait  Description: Interventions:  - Assess patient's functional ability and stability  - Promote increasing activity/tolerance for mobility and gait  - Educate and engage patient/family in tolerated activity level and precautions  - Recommend use of  RW for transfers and ambulation  Outcome: Progressing     Problem: SAFETY ADULT - FALL  Goal: Free from fall injury  Description: INTERVENTIONS:  - Assess pt frequently for physical needs  - Identify cognitive and physical deficits and behaviors that affect risk of falls.   - [place] fall precautions as indicated by assessment.  - Educate pt/family on patient safety including physical limitations  - Instruct pt to call for assistance with activity based on assessment  - Modify environment to reduce risk of injury  - Provide assistive devices as appropriate  - Consider OT/PT consult to assist with strengthening/mobility  - Encourage toileting schedule  Outcome: Progressing

## 2023-11-08 NOTE — ED QUICK NOTES
Orders for admission, patient is aware of plan and ready to go upstairs. Any questions, please call ED RN Elvira Chong at extension [phone].      Patient Covid vaccination status: Unvaccinated     COVID Test Ordered in ED: None    COVID Suspicion at Admission: N/A    Running Infusions:   NS 60 ml/hr     Mental Status/LOC at time of transport: 4/4    Other pertinent information:  unsteady with standing 2 person assist   CIWA score: N/A   NIH score:  1

## 2023-11-08 NOTE — PLAN OF CARE
Stroke education given to patient; the following education was provided:     BEFAST - Stroke warning signs and symptoms  Personalized risk factors - none  Need for follow-up after discharge  How to activate EMS for stroke  Healthy lifestyle (nutrition and exercise)    Patient and friend present during education. Patient and friend receptive to teachings. All pertinent questions and concerns were addressed.      Britany Galaviz RN, BSN  Stroke Navigator  [phone]

## 2023-11-08 NOTE — PROGRESS NOTES
11/08/23 1035   Clinical Encounter Type   Visited With Patient; Health care provider   Routine Visit Introduction   Referral From Other (Comment)   Referral To    Taxonomy   Intended Effects Demonstrate caring and concern   Methods Offer support   Interventions Silent prayer; Active listening       Resident   Raquel Molina

## 2023-11-08 NOTE — PHYSICAL THERAPY NOTE
PHYSICAL THERAPY EVALUATION - INPATIENT     Room Number: 9602/1663-Q  Evaluation Date: 11/8/2023  Type of Evaluation: Initial  Physician Order: PT Eval and Treat    Presenting Problem: CVA  Co-Morbidities : depression, ataxia, dementia, CVA, CAD, PTSD  Reason for Therapy: Mobility Dysfunction and Discharge Planning    History related to current admission: Patient is a [de-identified]year old male admitted on 11/7/2023 from home for right sided weakness, speech difficulties. Pt diagnosed with acute CVA. ASSESSMENT   In this PT evaluation, the patient presents with the following impairments decreased strength R le/ue, decreased core control leading to lob to the R, diminished motor planning. These impairments and comorbidities manifest themselves as functional limitations in independent bed mobility, transfers, and gait. The patient is below baseline and would benefit from skilled inpatient PT to address the above deficits to assist patient in returning to prior to level of function. Functional outcome measures completed include AMPAC and modified [place]. The AM-PAC '6-Clicks' Inpatient Basic Mobility Short Form was completed and this patient is demonstrating a Approx Degree of Impairment: 61.29%  degree of impairment in mobility. Research supports that patients with this level of impairment may benefit from acute rehab at d/c.    DISCHARGE RECOMMENDATIONS  PT Discharge Recommendations: Acute rehabilitation    PLAN  PT Treatment Plan: Bed mobility; Patient education;Gait training;Neuromuscular re-educate;Strengthening;Transfer training;Balance training  Rehab Potential : Good  Frequency (Obs): 3-5x/week  Number of Visits to Meet Established Goals: 5      CURRENT GOALS    Goal #1 Patient is able to demonstrate supine - sit EOB @ level: modified independent     Goal #2 Patient is able to demonstrate transfers EOB to/from [facility] at assistance level: minimum assistance     Goal #3 Patient is able to ambulate 75 feet with assist device: walker - rolling at assistance level: minimum assistance     Goal #4    Goal #5    Goal #6    Goal Comments: Goals established on 2023    HOME SITUATION  Type of Home: House   Home Layout: Two level; Able to live on main level        Stairs to Bedroom: 12  Railing: Yes    Lives With: Friend(s) (lives with a  couple; the  of the couple is bedbound. disabled and the wife works full time)  Drives: No  Patient Owned Equipment: Rollator;Cane  Patient Regularly Uses: Reading glasses    Prior Level of [place]: Pt is a retired .  [place] Pt was independent walking w/ rolling walker and would have supervision when going up and down stairs.  is a paraplegic and is unable to assist pt. The wife works full time and is not available to assist pt all the time. Pt reports he just put in an application at [facility]. Pat's to become a long term resident that as he knows his needs will probably increase. Pt had been driving up to ~ 2 months ago. Is independent w/ his ADL's typically. SUBJECTIVE  \"My right side is shot. \"      OBJECTIVE  Precautions: Aspiration;Bed/chair alarm;Hard of hearing  Fall Risk: High fall risk    WEIGHT BEARING RESTRICTION  Weight Bearing Restriction: None                PAIN ASSESSMENT  Ratin          COGNITION  Overall Cognitive Status:  Impaired  Orientation Level:  oriented to situation, oriented to person, and oriented to \"hospital in [facility] but not name and said  at 1st for year, but corrected w/ cues.   Memory:  decreased short term memory  Following Commands:  follows one step commands with increased time and follows one step commands with repetition  Safety Judgement:  decreased awareness of need for safety    RANGE OF MOTION AND STRENGTH ASSESSMENT  Upper extremity ROM and strength -see OT evaluation     Lower extremity ROM is within functional limits     Lower extremity strength is within functional limits except for the following:    Right Hip flexion  3-/5  Left Hip flexion  4-/5  Right Knee extension  3+/5  Left Knee extension  4-/5  Right Dorsiflexion  3+/5  Left Dorsiflexion  4-/5      BALANCE  Static Sitting: Poor +  Dynamic Sitting: Poor  Static Standing: Poor  Dynamic Standing: Poor -    ADDITIONAL TESTS  Additional Tests: Modified [place]              Modified [place]: 4                  ACTIVITY TOLERANCE  Pulse: 73  Heart Rate Source: Monitor  Resp: 13  BP: 115/75  BP Location: Left arm  BP Method: Automatic  Patient Position: Sitting    O2 WALK       NEUROLOGICAL FINDINGS  Neurological Findings: Coordination - Rapid Alternating Movement; Coordination - Heel to [employer] - Heel to [place]: Symmetrical  Coordination - Rapid Alternating Movement: Asymmetrical;Right decreased speed;Right decreased accuracy            AM-PAC '6-Clicks' INPATIENT SHORT FORM - BASIC MOBILITY  How much difficulty does the patient currently have. .. Patient Difficulty: Turning over in bed (including adjusting bedclothes, sheets and blankets)?: None   Patient Difficulty: Sitting down on and standing up from a chair with arms (e.g., wheelchair, bedside commode, etc.): A Lot   Patient Difficulty: Moving from lying on back to sitting on the side of the bed?: A Little   How much help from another person does the patient currently need. ..    Help from Another: Moving to and from a bed to a chair (including a wheelchair)?: A Lot   Help from Another: Need to walk in hospital room?: A Lot   Help from Another: Climbing 3-5 steps with a railing?: Total       AM-PAC Score:  Raw Score: 14   Approx Degree of Impairment: 61.29%   Standardized Score (AM-PAC Scale): 38.1   CMS Modifier (G-Code): CL    FUNCTIONAL ABILITY STATUS  Gait Assessment   Functional Mobility/Gait Assessment  Gait Assistance: Maximum assistance  Distance (ft): 4 sidesteps, then 3'x1 to chair  Assistive Device: Rolling walker  Pattern: Shuffle;R Foot drag    Skilled Therapy Provided     Bed Mobility:  Rolling: Right w/ hob elevated - mod I  Supine to sit: CGA, once achieved sitting pt w/ tendency to lose his balance to the right and anteriorly. Required min a of 1 throughout sitting to insure safety. Pt can maintain centered sitting for up to ~10 seconds, but then losing balance right/forward. Using ue's to try and compensate for lob. Noted mild dizziness upon sitting, bp taken and was stable. Sit to supine: NT     Transfer Mobility:  Sit to stand: Cues for proper hand placement - completed 2x - w/ mod a of 1. Once in standing though - losing balance to the R. Needing mod a of 1 on R and min a of 1 on L. Stand to sit: Cues for safety technique - pt not fully able to follow in real time. Min a of 2 to control descent into sitting. Gait = Completed gait 4 sidesteps to his right w/ mod a of 2 for balance and walker management. Pt rested, then completed 3 pivoting steps to recliner placed on pt's right - mod a of 2. Therapist's Comments: Reinforced w/ pt that he needs assist for all mobility and discussed d/c recommendations. Pt expresses understanding. Exercise/Education Provided:  Bed mobility  Functional activity tolerated  Gait training  Transfer training    Patient End of Session: Up in chair;Needs met;Call light within reach;RN aware of session/findings; All patient questions and concerns addressed; Alarm set (Keena Oakley/Brina aware of eval session)      Patient Evaluation Complexity Level:  History Moderate - 1 or 2 personal factors and/or co-morbidities   Examination of body systems Moderate - addressing a total of 3 or more elements   Clinical Presentation Moderate - Evolving   Clinical Decision Making Moderate - Evolving       PT Session Time: 31 minutes  Gait Trainin minutes  Therapeutic Activity: 12 minutes  Neuromuscular Re-education: 4 minutes  Therapeutic Exercise: 0 minutes

## 2023-11-08 NOTE — SLP NOTE
ADULT SWALLOWING EVALUATION    ASSESSMENT    ASSESSMENT/OVERALL IMPRESSION:  Laura Bhandari is a [de-identified]year old male with PMHx CVA HLD,  presented to ER w/ MILAN grayson. Patient and his friend on the phone report that he has excess of mucus and phlegm with history of throat clearing with and without eating/drinking. Patient contacted at the bedside after RN reported he was having difficulty with eating toast. Patient NPO per stroke protocol and patient with visible difficulty with swallowing. Oral motor mechanism exam revealed functional oral range, rate, and strength of oral musculature, upper and lower partials, and clear vocal quality. Strong cough on command. Assessed patient with thin liquids via spoon, cup, and straw, puree, and solids. Oral phase revealed functional oral acceptance, retrieval and mastication of solids with timely and complete clearing of the oral cavity. Pharyngeal phase revealed an apparent timely initiation of the pharyngeal phase with functional hyolaryngeal elevation on palpation. No coughing or throat clearing. Patient presents with functional oral phase and apparent functional pharyngeal phase free of overt clinical s/s of aspiration. Recommend regular solids and thin liquids with adherence to aspiration precautions. Will follow up to ensure safety with diet and educate pt on compensatory strategies/swallow precautions. Video swallow study to be completed if CXR declines, increase in clinical signs of aspiration, and/or MD desires. Recommend speech/language evaluation per stroke protocol. [place] Assessment of Swallow Function Score: No abnormality detected    RECOMMENDATIONS   Diet Recommendations - Solids: Regular  Diet Recommendations - Liquids: Thin Liquids      Compensatory Strategies Recommended: Alternate consistencies (alternate consistencies as needed)  Aspiration Precautions: Upright position; Slow rate  Medication Administration Recommendations: No restrictions  Treatment Plan/Recommendations: Aspiration precautions;Communication evaluation  Discharge Recommendations/Plan: Undetermined    HISTORY   MEDICAL HISTORY  Reason for Referral: Stroke protocol    Problem List  Principal Problem:    Acute CVA (cerebrovascular accident) [facility])      Past Medical History  Past Medical History:   Diagnosis Date    Ataxia     Atherosclerosis of coronary artery     Depression     Heart attack ([facility] [address]     PVC (premature ventricular contraction)     Stroke ([facility] [address]     min       Prior Living Situation: Home with support  Diet Prior to Admission: Regular; Thin liquids  Precautions: Aspiration    Patient/Family Goals: To eat and drink    SWALLOWING HISTORY  Current Diet Consistency: NPO  Dysphagia History: No past history  Imaging Results:    CONCLUSION:  Small linear acute infarct noted at the posterior aspect of the left corona radiata. SUBJECTIVE       OBJECTIVE   ORAL MOTOR EXAMINATION  Dentition: Natural;Functional;Upper partials; Lower partials  Symmetry: Within Functional Limits  Strength: Within Functional Limits  Tone: Within Functional Limits  Range of Motion: Within Functional Limits  Rate of Motion: Within Functional Limits    Voice Quality: Clear  Respiratory Status: Unlabored  Consistencies Trialed: Thin liquids;Puree;Hard solid  Method of Presentation: Self presentation;Spoon;Cup;Straw;Consecutive swallows  Patient Positioning: Upright;Midline;Standard chair    Oral Phase of Swallow: Within Functional Limits         Pharyngeal Phase of Swallow: Within Functional Limits           (Please note: Silent aspiration cannot be evaluated clinically.  Videofluoroscopic Swallow Study is required to rule-out silent aspiration.)    Esophageal Phase of Swallow: No complaints consistent with possible esophageal involvement  Comments:               GOALS  Goal #1 The patient will tolerate regular consistency and thin liquids without overt signs or symptoms of aspiration with 95 % accuracy over 1-2 session(s). In Progress   Goal #2 The patient/family/caregiver will demonstrate understanding and implementation of aspiration precautions and swallow strategies independently over 1-2 session(s). In Progress   Goal #3 Patient will participate in cognitive/communicative assessment per stroke protocol.    Not Addressed     FOLLOW UP  Treatment Plan/Recommendations: Aspiration precautions;Communication evaluation  Number of Visits to Meet Established Goals: 4  Follow Up Needed (Documentation Required): Yes  SLP Follow-up Date: 11/09/23    Thank you for your referral.   If you have any questions, please contact Dolores Scott, LILIANA

## 2023-11-09 ENCOUNTER — APPOINTMENT (OUTPATIENT)
Dept: CT IMAGING | Facility: HOSPITAL | Age: 78
End: 2023-11-09
Attending: INTERNAL MEDICINE
Payer: MEDICARE

## 2023-11-09 VITALS
RESPIRATION RATE: 14 BRPM | DIASTOLIC BLOOD PRESSURE: 74 MMHG | OXYGEN SATURATION: 96 % | WEIGHT: 160 LBS | BODY MASS INDEX: 22.9 KG/M2 | SYSTOLIC BLOOD PRESSURE: 130 MMHG | TEMPERATURE: 98 F | HEIGHT: 70 IN | HEART RATE: 60 BPM

## 2023-11-09 PROBLEM — J44.9 CHRONIC OBSTRUCTIVE PULMONARY DISEASE (HCC): Status: ACTIVE | Noted: 2023-11-09

## 2023-11-09 PROBLEM — J44.9 CHRONIC OBSTRUCTIVE PULMONARY DISEASE (HCC): Status: ACTIVE | Noted: 2023-01-01

## 2023-11-09 LAB
GLUCOSE BLD-MCNC: 111 MG/DL (ref 70–99)
GLUCOSE BLD-MCNC: 94 MG/DL (ref 70–99)

## 2023-11-09 PROCEDURE — 99239 HOSP IP/OBS DSCHRG MGMT >30: CPT | Performed by: INTERNAL MEDICINE

## 2023-11-09 PROCEDURE — 99232 SBSQ HOSP IP/OBS MODERATE 35: CPT

## 2023-11-09 PROCEDURE — 70450 CT HEAD/BRAIN W/O DYE: CPT | Performed by: INTERNAL MEDICINE

## 2023-11-09 RX ORDER — ASPIRIN 325 MG
325 TABLET ORAL DAILY
Qty: 30 TABLET | Refills: 2 | Status: SHIPPED | OUTPATIENT
Start: 2023-11-10

## 2023-11-09 NOTE — PLAN OF CARE
Assumed care at 0730  Patient alert, oriented x3-4, confused at times  Q4 hour neuro checks, some delayed responses, right side slightly weaker  VSS  RA throughout shift  NSR on tele  Denies pain  Ambulating x2 max to commode  Call light within reach, safety precautions maintained    Patient and family updated on plan of care  Belongings sent with patient  Report called to IRENA Colon at [facility]    Discharged Rehab facility via Ambulance. Accompanied by Support staff  Belongings Taken by patient/family.

## 2023-11-09 NOTE — PROGRESS NOTES
Significant Event - Fall Note    Date/Time of Fall: November 9, 2023 at 0900    Fall huddle completed: Yes    Description of patient fall:     Patient fell from: Bed     Activity when fall occurred: Ambulating without assistance or assistive devices and Toileting-related activities     Where did fall occur: Patient room     Was the fall assisted: Found on floor/unassisted to floor    Who witnessed the fall: Unwitnessed    Patient narrative of fall: \"I had to go to the bathroom really bad. \"    Staff narrative of fall: Patient with dementia decided to get out of bed on his own to go to the bathroom. Once bed alarm was heard, the PCT and RN found patient sitting on the floor. Name of Provider notified of fall: [address] notification: Family notified.      Factors contributing to fall:     Physical: Deconditioned, Impaired mobility/transfer, Unsteady gait, and Weakness/fatigue     Psychological: Confused and Impulsive     Environmental: none     Medications received in the past 8 hours:   Medication(s) Administered in past 8 Hours from 11/09/2023 0202 to 11/09/2023 1002       Date/Time Order Dose Route Action Action by Comments    11/09/2023 0433 CST sodium chloride 0.9% infusion -- Intravenous Prema Pitt RN --            Was patient identified as high fall risk prior to fall:                                What interventions were in place prior to fall: Bed alarm, Bed in lowest position, Call light within reach, Fall alert wristband, [place] footwear, Patient situated close to nursing station, Personal items within reach, Reality orientation, and Rounding    Interventions post fall: Assistive devices (wheelchair, commode, walker, gait belt), Bed alarm, Bed in lowest position, Camera, Call light within reach, Reality orientation, and Rounding    Additional comments:

## 2023-11-09 NOTE — PLAN OF CARE
Assumed care at 0730  Patient alert, oriented x2-4  Right sided weakness, RUE drift, R ataxia  VSS  RA throughout shift  NSR on tele  Denies pain  Up x2 and walker, weaker on right side  Speech eval completed  Echo competed  Call light within reach    Patient and family updated on plan of care

## 2023-11-09 NOTE — CM/SW NOTE
11/09/23 1200   CM/SW Referral Data   Referral Source Social Work (self-referral)   Reason for Referral Discharge planning;Protocol order set   Informant Sibling;Clinical Staff Member;EMR   Patient [address]   Patient lives with Other  (Friends)   Patient Status Prior to Admission   Independent with ADLs and Mobility No   Discharge Needs   Anticipated D/C needs Acute rehab;Long term care facility   Services Requested   PASRR Level 1 Submitted Yes   PMR Consult Requested Consult ordered   Choice of Post-Acute Provider   Informed patient of right to choose their preferred provider Yes   List of appropriate post-acute services provided to patient/family with quality data Yes      consult for [facility] eval. Pt admits from home for right sided weakness, speech difficulties. Pt diagnosed with acute CVA. Pt is a retired  and has been residing at a friend, Jacob Jain and her 's house. Pt sister/POA, Renetta Koehler resides out of state and is involved in pt care. Has been working on [employer] with pt friend, Jacob Jain. Pt has been on the waiting list for [facility]. POA has updated [facility] on admission and they can take for LANDRY. PT/OT eval recommends [facility] at DC. PMR consulted and agreeable to recommendations. Discussed with pt POA on plans. Ideally, pt would DC to  [facility] and then transition to [facility]/LTC. Per Bayron Byrd at [address] [place] [address]-- they could accept for LANDRY if recs are LANDRY after [facility]. They cannot guarantee a long term care bed at this time due to wait list. Notified [facility] liaison. Confirming plans with [facility] liaison.        Maru Becker, [facility]  Discharge [address] [facility].

## 2023-11-09 NOTE — SLP NOTE
SPEECH DAILY NOTE - INPATIENT    ASSESSMENT & PLAN   ASSESSMENT  Patient seen with recommended diet of regular solids and thin liquids. He was sitting upright and midline in bed. He demonstrated independent utilization of alternating consistencies. He utilized a slow rate throughout po intake. No overt clinical s/s of aspiration. Patient demonstrates safe and efficient po intake of recommended diet. Recommend a cognitive/communicative assessment per stroke protocol. Will continue to follow for assessment of cognition/communication. Discharge from skilled swallowing intervention. Diet Recommendations - Solids: Regular  Diet Recommendations - Liquids: Thin Liquids    Compensatory Strategies Recommended: Alternate consistencies (alternate consistencies as needed)  Aspiration Precautions: Upright position; Slow rate  Medication Administration Recommendations: No restrictions    Patient Experiencing Pain: No                Discharge Recommendations  Discharge Recommendations/Plan: Acute rehabilitation    Treatment Plan  Treatment Plan/Recommendations: Communication evaluation    Interdisciplinary Communication: Discussed with RN          GOALS  Goal #1 The patient will tolerate regular consistency and thin liquids without overt signs or symptoms of aspiration with 95 % accuracy over 1-2 session(s). Met   Goal #2 The patient/family/caregiver will demonstrate understanding and implementation of aspiration precautions and swallow strategies independently over 1-2 session(s). Met   Goal #3 Patient will participate in cognitive/communicative assessment per stroke protocol.    Not Addressed         FOLLOW UP  Follow Up Needed (Documentation Required): Yes  SLP Follow-up Date: 11/10/23  Number of Visits to Meet Established Goals: 3    Session: 1 of 3    If you have any questions, please contact LILIANA Jones

## 2023-11-09 NOTE — PLAN OF CARE
Assumed care at 1900. Alert and oriented x4; confusion at times. Q4H neuro assessments completed with no new deficits noted. Pt impulsive throughout the shift trying to get out of bed. NSR, SB with occasional PVCs; RA; denies pain. Pt resting comfortably in bed with new brief; no bowel movement noted during shift. Fall precautions in place and call light within reach. Problem: NEUROLOGICAL - ADULT  Goal: Achieves stable or improved neurological status  Description: INTERVENTIONS  - Assess for and report changes in neurological status  - Initiate measures to prevent increased intracranial pressure  - Maintain blood pressure and fluid volume within ordered parameters to optimize cerebral perfusion and minimize risk of hemorrhage  - Monitor temperature, glucose, and sodium.  Initiate appropriate interventions as ordered  Outcome: Progressing  Goal: Achieves maximal functionality and self care  Description: INTERVENTIONS  - Monitor swallowing and airway patency with patient fatigue and changes in neurological status  - Encourage and assist patient to increase activity and self care with guidance from PT/OT  - Encourage visually impaired, hearing impaired and aphasic patients to use assistive/communication devices  Outcome: Progressing     Problem: Impaired Functional Mobility  Goal: Achieve highest/safest level of mobility/gait  Description: Interventions:  - Assess patient's functional ability and stability  - Promote increasing activity/tolerance for mobility and gait  - Educate and engage patient/family in tolerated activity level and precautions  Outcome: Progressing     Problem: Impaired Activities of Daily Living  Goal: Achieve highest/safest level of independence in self care  Description: Interventions:  - Assess ability and encourage patient to participate in ADLs to maximize function  - Promote sitting position while performing ADLs such as feeding, grooming, and bathing  - Educate and encourage patient/family in tolerated functional activity level and precautions during self-care  Outcome: Progressing     Problem: SAFETY ADULT - FALL  Goal: Free from fall injury  Description: INTERVENTIONS:  - Assess pt frequently for physical needs  - Identify cognitive and physical deficits and behaviors that affect risk of falls.   - [place] fall precautions as indicated by assessment.  - Educate pt/family on patient safety including physical limitations  - Instruct pt to call for assistance with activity based on assessment  - Modify environment to reduce risk of injury  - Provide assistive devices as appropriate  - Consider OT/PT consult to assist with strengthening/mobility  - Encourage toileting schedule  Outcome: Progressing

## 2023-11-09 NOTE — CM/SW NOTE
[facility]. Aware of long term care plans. Pt cleared for DC. Notified pt sister/POA of DC plans. Pt to dc to rm 1166 RN to call [phone] for report.        11/09/23 1600   Discharge disposition   Expected discharge disposition Rehab [address] Provider [facility]   Discharge transportation [place], [place]  Discharge [address] [facility]

## 2023-11-13 ENCOUNTER — PATIENT OUTREACH (OUTPATIENT)
Dept: CASE MANAGEMENT | Age: 78
End: 2023-11-13

## 2023-11-13 NOTE — PROGRESS NOTES
Hospital follow up. Stroke follow up, 4-6 weeks. Chase Hanson D.O. Neurology  [facility] [address], [address] [facility]  [phone]  Monday 1/8 @2:35    Confirmed with patient's friend. Closing encounter.

## 2023-11-26 ENCOUNTER — HOSPITAL ENCOUNTER (EMERGENCY)
Facility: HOSPITAL | Age: 78
Discharge: HOME | End: 2023-11-26
Attending: EMERGENCY MEDICINE
Payer: MEDICARE

## 2023-11-26 ENCOUNTER — APPOINTMENT (OUTPATIENT)
Dept: CT IMAGING | Facility: HOSPITAL | Age: 78
End: 2023-11-26
Attending: EMERGENCY MEDICINE
Payer: MEDICARE

## 2023-11-26 ENCOUNTER — APPOINTMENT (OUTPATIENT)
Dept: MRI IMAGING | Facility: HOSPITAL | Age: 78
End: 2023-11-26
Attending: EMERGENCY MEDICINE
Payer: MEDICARE

## 2023-11-26 ENCOUNTER — APPOINTMENT (OUTPATIENT)
Dept: GENERAL RADIOLOGY | Facility: HOSPITAL | Age: 78
End: 2023-11-26
Attending: EMERGENCY MEDICINE
Payer: MEDICARE

## 2023-11-26 VITALS
SYSTOLIC BLOOD PRESSURE: 132 MMHG | RESPIRATION RATE: 21 BRPM | TEMPERATURE: 98 F | WEIGHT: 160.06 LBS | HEIGHT: 70 IN | DIASTOLIC BLOOD PRESSURE: 79 MMHG | HEART RATE: 61 BPM | BODY MASS INDEX: 22.91 KG/M2 | OXYGEN SATURATION: 95 %

## 2023-11-26 DIAGNOSIS — R29.898 RIGHT ARM WEAKNESS: Primary | ICD-10-CM

## 2023-11-26 LAB
ALBUMIN SERPL-MCNC: 3.5 G/DL (ref 3.4–5)
ALBUMIN/GLOB SERPL: 0.9 {RATIO} (ref 1–2)
ALP LIVER SERPL-CCNC: 64 U/L
ALT SERPL-CCNC: 32 U/L
ANION GAP SERPL CALC-SCNC: 4 MMOL/L (ref 0–18)
APTT PPP: 30.8 SECONDS (ref 23.3–35.6)
AST SERPL-CCNC: 26 U/L (ref 15–37)
ATRIAL RATE: 67 BPM
BASOPHILS # BLD AUTO: 0.05 X10(3) UL (ref 0–0.2)
BASOPHILS NFR BLD AUTO: 0.5 %
BILIRUB SERPL-MCNC: 0.7 MG/DL (ref 0.1–2)
BUN BLD-MCNC: 15 MG/DL (ref 9–23)
CALCIUM BLD-MCNC: 9.2 MG/DL (ref 8.5–10.1)
CHLORIDE SERPL-SCNC: 103 MMOL/L (ref 98–112)
CO2 SERPL-SCNC: 30 MMOL/L (ref 21–32)
CREAT BLD-MCNC: 0.84 MG/DL
EGFRCR SERPLBLD CKD-EPI 2021: 88 ML/MIN/1.73M2 (ref 60–?)
EOSINOPHIL # BLD AUTO: 0.44 X10(3) UL (ref 0–0.7)
EOSINOPHIL NFR BLD AUTO: 4.8 %
ERYTHROCYTE [DISTWIDTH] IN BLOOD BY AUTOMATED COUNT: 14 %
GLOBULIN PLAS-MCNC: 4.1 G/DL (ref 2.8–4.4)
GLUCOSE BLD-MCNC: 103 MG/DL (ref 70–99)
GLUCOSE BLD-MCNC: 97 MG/DL (ref 70–99)
HCT VFR BLD AUTO: 43.5 %
HGB BLD-MCNC: 14.7 G/DL
IMM GRANULOCYTES # BLD AUTO: 0.04 X10(3) UL (ref 0–1)
IMM GRANULOCYTES NFR BLD: 0.4 %
INR BLD: 1.14 (ref 0.8–1.2)
LYMPHOCYTES # BLD AUTO: 1.59 X10(3) UL (ref 1–4)
LYMPHOCYTES NFR BLD AUTO: 17.4 %
MCH RBC QN AUTO: 29.3 PG (ref 26–34)
MCHC RBC AUTO-ENTMCNC: 33.8 G/DL (ref 31–37)
MCV RBC AUTO: 86.8 FL
MONOCYTES # BLD AUTO: 1.11 X10(3) UL (ref 0.1–1)
MONOCYTES NFR BLD AUTO: 12.2 %
NEUTROPHILS # BLD AUTO: 5.89 X10 (3) UL (ref 1.5–7.7)
NEUTROPHILS # BLD AUTO: 5.89 X10(3) UL (ref 1.5–7.7)
NEUTROPHILS NFR BLD AUTO: 64.7 %
OSMOLALITY SERPL CALC.SUM OF ELEC: 285 MOSM/KG (ref 275–295)
P AXIS: 78 DEGREES
P-R INTERVAL: 208 MS
PLATELET # BLD AUTO: 308 10(3)UL (ref 150–450)
POTASSIUM SERPL-SCNC: 4.1 MMOL/L (ref 3.5–5.1)
PROT SERPL-MCNC: 7.6 G/DL (ref 6.4–8.2)
PROTHROMBIN TIME: 14.6 SECONDS (ref 11.6–14.8)
Q-T INTERVAL: 440 MS
QRS DURATION: 130 MS
QTC CALCULATION (BEZET): 464 MS
R AXIS: 49 DEGREES
RBC # BLD AUTO: 5.01 X10(6)UL
SODIUM SERPL-SCNC: 137 MMOL/L (ref 136–145)
T AXIS: -6 DEGREES
TROPONIN I SERPL HS-MCNC: 12 NG/L
VENTRICULAR RATE: 67 BPM
WBC # BLD AUTO: 9.1 X10(3) UL (ref 4–11)

## 2023-11-26 PROCEDURE — 70450 CT HEAD/BRAIN W/O DYE: CPT | Performed by: EMERGENCY MEDICINE

## 2023-11-26 PROCEDURE — 85730 THROMBOPLASTIN TIME PARTIAL: CPT | Performed by: EMERGENCY MEDICINE

## 2023-11-26 PROCEDURE — 70551 MRI BRAIN STEM W/O DYE: CPT | Performed by: EMERGENCY MEDICINE

## 2023-11-26 PROCEDURE — 99285 EMERGENCY DEPT VISIT HI MDM: CPT

## 2023-11-26 PROCEDURE — 80053 COMPREHEN METABOLIC PANEL: CPT | Performed by: EMERGENCY MEDICINE

## 2023-11-26 PROCEDURE — 85610 PROTHROMBIN TIME: CPT

## 2023-11-26 PROCEDURE — 71045 X-RAY EXAM CHEST 1 VIEW: CPT | Performed by: EMERGENCY MEDICINE

## 2023-11-26 PROCEDURE — 85025 COMPLETE CBC W/AUTO DIFF WBC: CPT | Performed by: EMERGENCY MEDICINE

## 2023-11-26 PROCEDURE — 93005 ELECTROCARDIOGRAM TRACING: CPT

## 2023-11-26 PROCEDURE — 85730 THROMBOPLASTIN TIME PARTIAL: CPT

## 2023-11-26 PROCEDURE — 93010 ELECTROCARDIOGRAM REPORT: CPT

## 2023-11-26 PROCEDURE — 36415 COLL VENOUS BLD VENIPUNCTURE: CPT

## 2023-11-26 PROCEDURE — 85610 PROTHROMBIN TIME: CPT | Performed by: EMERGENCY MEDICINE

## 2023-11-26 PROCEDURE — 70496 CT ANGIOGRAPHY HEAD: CPT | Performed by: EMERGENCY MEDICINE

## 2023-11-26 PROCEDURE — 84484 ASSAY OF TROPONIN QUANT: CPT | Performed by: EMERGENCY MEDICINE

## 2023-11-26 PROCEDURE — 82962 GLUCOSE BLOOD TEST: CPT

## 2023-11-26 PROCEDURE — 70498 CT ANGIOGRAPHY NECK: CPT | Performed by: EMERGENCY MEDICINE

## 2023-11-26 NOTE — ED INITIAL ASSESSMENT (HPI)
Pt currently at [address] [place] [address] for rehab from previous stroke. Per pt pre-existing deficit on the right side from stroke. LKW 7pm last night. Pt woke up with weakness at 0700 today with increased weakness on the right side. Pt is A/Ox4. Noted with some weakness on the right arm and leg.

## 2023-11-26 NOTE — SIGNIFICANT EVENT
Called to A7 for stroke alert at 0835  Arrived to department at 1829  Dr. Bong Cooper assessing patient upon navigator arrival  Last Known Normal at 200 yesterday  Pre-morbid mRS 4    Initial NIHSS 3 including right sided weakness, decreased sensation to right side  Pt accompanied to CT dept at 0843  Dr Josias Squires (Neuro Critical Care) notified at 5289  Repeat NIHSS completed back in ED room    NIH Stroke Scale  1a  Level of consciousness: 0   1b. LOC questions:  0   1c. LOC commands: 0   2. Best Gaze: 0   3. Visual: 0   4. Facial Palsy: 0   5a. Motor left arm: 0   5b. Motor right arm: 1 (able to hold for 10 seconds but drift is noticeable)   6a. Motor left le   6b  Motor right le(able to hold for 5 seconds but drift is noticeable)   7. Limb Ataxia: 0   8. Sensory: 1 (still able to feel, but reports decreased sensation to right arm and leg)   9. Best Language:  0   10. Dysarthria: 0   11. Extinction and Inattention: 0     Total:   3 (Excluding old symptoms NIHSS 1)        Dr Josias Squires updated on patient's status, CT/CTA results and repeat NIHSS   Per Dr Josias Squires, patient is not a candidate for TNK  Case discussed with Dr. Lsisy Holland (gen neuro), he reccomends MRI with DWI  Case discussed with Dr Bogn Cooper, ED  Dysphagia evaluation prior to administration of oral medications discussed with patient's RN     Of note: History obtained from patient and his family friend, Macey Xavier. They both report that he recently had a stroke on  of this year. He spent a few weeks in [facility] and was recently discharged to [facility]. Per Macey Xavier, prior to first stroke he was able to walk without an assistive device and perform all of his own ADL's. Since the stroke, he is mostly wheelchair bound, but is able to take a few steps with a walker and assistance. He needs help using the bathroom and bathing. He is still able to feed himself. She reports that he is a/ox4 at baseline.  Macey Xavier says he has not received therapy in 4 days because of the move to [address] [place] [address] followed immediately by the holiday. She suspects this could be contributing to his perceived sense of being more weak on the right side today.

## 2023-12-13 ENCOUNTER — OFFICE VISIT (OUTPATIENT)
Dept: NEUROLOGY | Facility: CLINIC | Age: 78
End: 2023-12-13
Payer: MEDICARE

## 2023-12-13 VITALS — RESPIRATION RATE: 16 BRPM | DIASTOLIC BLOOD PRESSURE: 66 MMHG | HEART RATE: 65 BPM | SYSTOLIC BLOOD PRESSURE: 100 MMHG

## 2023-12-13 DIAGNOSIS — I69.351 HEMIPARESIS OF RIGHT DOMINANT SIDE AS LATE EFFECT OF CEREBRAL INFARCTION (HCC): Primary | ICD-10-CM

## 2023-12-13 PROCEDURE — 99214 OFFICE O/P EST MOD 30 MIN: CPT | Performed by: OTHER

## 2023-12-13 RX ORDER — OMEPRAZOLE 20 MG/1
20 CAPSULE, DELAYED RELEASE ORAL
COMMUNITY

## 2023-12-13 RX ORDER — HEPARIN SODIUM 5000 [USP'U]/.5ML
1 INJECTION, SOLUTION INTRAVENOUS; SUBCUTANEOUS 2 TIMES DAILY
COMMUNITY

## 2023-12-13 RX ORDER — GUAIFENESIN 600 MG/1
1200 TABLET, EXTENDED RELEASE ORAL EVERY EVENING
COMMUNITY

## 2023-12-13 RX ORDER — FLUTICASONE PROPIONATE AND SALMETEROL 100; 50 UG/1; UG/1
1 POWDER RESPIRATORY (INHALATION) 2 TIMES DAILY
COMMUNITY

## 2023-12-13 RX ORDER — PAROXETINE HYDROCHLORIDE 40 MG/1
40 TABLET, FILM COATED ORAL EVERY MORNING
COMMUNITY

## 2024-01-01 ENCOUNTER — TELEPHONE (OUTPATIENT)
Dept: NEUROLOGY | Facility: CLINIC | Age: 79
End: 2024-01-01

## 2024-02-15 ENCOUNTER — OFFICE VISIT (OUTPATIENT)
Dept: NEUROLOGY | Facility: CLINIC | Age: 79
End: 2024-02-15
Payer: MEDICARE

## 2024-02-15 VITALS
SYSTOLIC BLOOD PRESSURE: 103 MMHG | HEART RATE: 83 BPM | OXYGEN SATURATION: 96 % | RESPIRATION RATE: 18 BRPM | DIASTOLIC BLOOD PRESSURE: 62 MMHG

## 2024-02-15 DIAGNOSIS — I69.351 HEMIPARESIS OF RIGHT DOMINANT SIDE AS LATE EFFECT OF CEREBRAL INFARCTION (HCC): Primary | ICD-10-CM

## 2024-02-15 PROCEDURE — 99214 OFFICE O/P EST MOD 30 MIN: CPT | Performed by: OTHER

## 2024-02-15 NOTE — PATIENT INSTRUCTIONS
Refill policies:    Allow 2-3 business days for refills; controlled substances may take longer.  Contact your pharmacy at least 5 days prior to running out of medication and have them send an electronic request or submit request through the “request refill” option in your [employer] account.  Refills are not addressed on weekends; covering physicians do not authorize routine medications on weekends.  No narcotics or controlled substances are refilled after noon on Fridays or by on call physicians.  By law, narcotics must be electronically prescribed.  A 30 day supply with no refills is the maximum allowed.  If your prescription is due for a refill, you may be due for a follow up appointment.  To best provide you care, patients receiving routine medications need to be seen at least once a year.  Patients receiving narcotic/controlled substance medications need to be seen at least once every 3 months.  In the event that your preferred pharmacy does not have the requested medication in stock (e.g. Backordered), it is your responsibility to find another pharmacy that has the requested medication available.  We will gladly send a new prescription to that pharmacy at your request.    Scheduling Tests:    If your physician has ordered radiology tests such as MRI or CT scans, please contact Central Scheduling at [phone] right away to schedule the test.  Once scheduled, the Critical access hospital Centralized Referral Team will work with your insurance carrier to obtain pre-certification or prior authorization.  Depending on your insurance carrier, approval may take 3-10 days.  It is highly recommended patients assure they have received an authorization before having a test performed.  If test is done without insurance authorization, patient may be responsible for the entire amount billed.      Precertification and Prior Authorizations:  If your physician has recommended that you have a procedure or additional testing performed the Critical access hospital  Centralized Referral Team will contact your insurance carrier to obtain pre-certification or prior authorization.    You are strongly encouraged to contact your insurance carrier to verify that your procedure/test has been approved and is a COVERED benefit.  Although the [facility] Centralized Referral Team does its due diligence, the insurance carrier gives the disclaimer that \"Although the procedure is authorized, this does not guarantee payment.\"    Ultimately the patient is responsible for payment.   Thank you for your understanding in this matter.  Paperwork Completion:  If you require FMLA or disability paperwork for your recovery, please make sure to either drop it off or have it faxed to our office at [phone]. Be sure the form has your name and date of birth on it.  The form will be faxed to our Forms Department and they will complete it for you.  There is a 25$ fee for all forms that need to be filled out.  Please be aware there is a 10-14 day turnaround time.  You will need to sign a release of information (GAL) form if your paperwork does not come with one.  You may call the Forms Department with any questions at [phone].  Their fax number is [phone].

## 2024-02-15 NOTE — PROGRESS NOTES
[facility] Progress Note    HPI  Chief Complaint   Patient presents with    Stroke     f/u 2 months,stroke. States that there is no improvement.      Initial notes as per 12/13/2023:    \"Sonido Amado is a 78 year old male with PMHx including but not limited to MI, depression, who presents in follow up after recent stroke.  Patient was seen by neurology at [facility] 11/7/2023 but this is patient's first visit to this author.     Per chart review and discussion with patient and caregiver / friend present at time of visit, he was noted to have R UE weakness after a fall.  He had mild R facial droop.  CT/CTA showed no LVO and MRI showed small left subcortical acute ischemic stroke. He was on ASA 81 mg prior to stroke and atorvastatin 20 mg daily. He had Echo with bubble study which showed no shunt/PFO; he was in rehab with PT/OT, speech therapy with subacute rehab.    He also has history of depression and has been following with psychiatry.        He lives at [facility] and reportedly had another event in the AM 11/26/2023; he was weak on the right side. He was using walker and rollator prior to stroke. He reportedly was seen by psychiatry for depression history - was recommended on 11/21/2023 to reduce Abilify to 1 mg nightly but unclear if this has been completed.\"        Patient last seen 12/13/2023; he has remained at [facility] and has remained in wheelchair and is doing restorative PT rather than getting up and walking much. He is on heparin for DVT prophylaxis. Family notes he has not had new weakness but is drowsier than in the past.    According to records, appears to be on abilify 5 mg daily as of 12/20/2023 and recently started on mirtazapine (Remeron) 15 mg nightly as of 2/12/2024    Labs ok as of 1/16/2024 Na 137     Past Medical History:   Diagnosis Date    Ataxia     Atherosclerosis of coronary artery     Depression     Heart attack (HCC)     PVC  (premature ventricular contraction)     Stroke (HCC)     min     Past Surgical History:   Procedure Laterality Date    HIP REPLACEMENT SURGERY      OTHER SURGICAL HISTORY      prostatet cancer, right hip repacement.     Family History   Problem Relation Age of Onset    Stroke Mother      Social History     Socioeconomic History    Marital status: Single   Tobacco Use    Smoking status: Former    Smokeless tobacco: Former   Vaping Use    Vaping Use: Never used   Substance and Sexual Activity    Alcohol use: Not Currently    Drug use: No       Allergies   Allergen Reactions    Erythromycin UNKNOWN     Cerner Allergy Text Annotation: erythromycin    Tetracycline UNKNOWN     Cerner Allergy Text Annotation: tetracycline         Current Outpatient Medications:     fluticasone-salmeterol 100-50 MCG/ACT Inhalation Aerosol Powder, Breath Activated, Inhale 1 puff into the lungs 2 (two) times daily., Disp: , Rfl:     guaiFENesin  MG Oral Tablet 12 Hr, Take 2 tablets (1,200 mg total) by mouth every evening., Disp: , Rfl:     Heparin Sodium, Porcine, 5000 UNIT/0.5ML Injection Solution Prefilled Syringe, Inject 1 mL as directed in the morning and 1 mL before bedtime., Disp: , Rfl:     PARoxetine HCl 40 MG Oral Tab, Take 1 tablet (40 mg total) by mouth every morning., Disp: , Rfl:     omeprazole 20 MG Oral Capsule Delayed Release, Take 1 capsule (20 mg total) by mouth every morning before breakfast., Disp: , Rfl:     aspirin 325 MG Oral Tab, Take 1 tablet (325 mg total) by mouth daily., Disp: 30 tablet, Rfl: 2    Vibegron (GEMTESA) 75 MG Oral Tab, Take 75 mg by mouth at bedtime., Disp: , Rfl:     ARIPiprazole 2 MG Oral Tab, Take 1 tablet (2 mg total) by mouth at bedtime., Disp: , Rfl:     acetaminophen 325 MG Oral Tab, Take 2 tablets (650 mg total) by mouth every 6 (six) hours as needed for Pain., Disp: , Rfl:     PEG 3350 17 g Oral Powd Pack, Take 17 g by mouth daily as needed (constipation)., Disp: , Rfl:     ALPRAZolam  0.5 MG Oral Tab, Take 0.5 tablets (0.25 mg total) by mouth 2 (two) times a day. (Patient taking differently: Take 0.5 tablets (0.25 mg total) by mouth every evening.), Disp: 14 tablet, Rfl: 0    atorvastatin 20 MG Oral Tab, Take 1 tablet (20 mg total) by mouth daily., Disp: , Rfl:     docusate sodium 100 MG Oral Cap, Take 1 capsule (100 mg total) by mouth daily., Disp: , Rfl:     Review of Systems:  No chest pain or palpitations; otherwise as noted in HPI.    Exam:  /62   Pulse 83   Resp 18   SpO2 96%   Estimated body mass index is 22.97 kg/m² as calculated from the following:    Height as of 11/26/23: 70\".    Weight as of 11/26/23: 160 lb 0.9 oz (72.6 kg).    Gen: well developed, well nourished, no acute distress  HEENT: normocephalic  Heart; normal S1/S2, regular rate and rhythm  Lungs: clear to auscultation bilaterally  Extremities: no edema, peripheral pulses intact    Neck: supple, full range of motion; no carotid bruits    Fundoscopic Exam: optic discs sharp bilaterally    Neuro:  Mental status:  Orientation: Alert and oriented to person, place, time  Speech Fluent and conversational but hoarse voice    CN: PERRL, EOMI with no nystagmus, VFF, smile symmetric, sensation intact, tongue and palate midline, SCM intact, otherwise, CN 2-12 intact  Motor: +mild R hemiparesis noted with +drift R UE 5-/5, and 4+/5 in R LE; otherwise, 5/5 strength throughout, tone normal  DTR: 2+ symmetric throughout, toes downgoing bilaterally, no clonus  Sensory: intact to light touch throughout  Coord: FNF intact with no tremor or dysmetria; rapid alternating movements intact bilaterally  Romberg: deferred  Gait: deferred, in wheel chair    Labs:  None new    Imaging:  None new    Prior as noted below    MRI STROKE BRAIN DWI ONLY(NO IV)(CPT=70551)    Result Date: 11/26/2023  CONCLUSION:  Stable curvilinear acute/subacute infarct involving the posterior aspect of the left corona radiata which is unchanged since 11/7/2023.    LOCATION:  [facility]   Dictated by (CST): Joe Cisneros MD on 11/26/2023 at 11:58 AM     Finalized by (CST): Joe Cisneros MD on 11/26/2023 at 11:59 AM       XR CHEST AP PORTABLE  (CPT=71045)    Result Date: 11/26/2023  CONCLUSION:   No acute disease.   LOCATION:  [facility]      Dictated by (CST): Joe Cisneros MD on 11/26/2023 at 9:34 AM     Finalized by (CST): Joe Cisneros MD on 11/26/2023 at 9:41 AM       CT STROKE CTA BRAIN/CTA NECK (W IV)(CPT=70496/88753)    Result Date: 11/26/2023  CONCLUSION:   1. No acute intracranial process.  2.  No evidence of high-grade stenosis, vessel cut off, aneurysm, or vascular malformation involving the intracranial vasculature.  3.  Unremarkable CTA of the cervical vessels with mild atherosclerotic plaque of the left greater than right internal carotid arteries.  Critical findings were discussed with Dr. Field at 0910 hours with read back.   LOCATION:  [facility]   Dictated by (CST): Joe Cisneros MD on 11/26/2023 at 9:06 AM     Finalized by (CST): Joe Cisneros MD on 11/26/2023 at 9:11 AM       CT STROKE BRAIN (NO IV)(CPT=70450)    Result Date: 11/26/2023  CONCLUSION:  No evidence of acute intracranial process.  Critical results were called to Dr. Field at 858 hours on 11/26/2023.  There was appropriate read back.    LOCATION:  [facility]   Dictated by (CST): Joe Cisneros MD on 11/26/2023 at 8:57 AM     Finalized by (CST): Joe Cisneros MD on 11/26/2023 at 8:59 AM       CT BRAIN OR HEAD (32224)    Result Date: 11/9/2023  CONCLUSION:  1. No acute intracranial hemorrhage. 2. The known small acute infarct at the posterior left corona radiata is better visualized on the prior brain MRI.  There is a background of mild chronic small vessel ischemic disease.  There is a chronic punctate infarct at the right paracentral madelyn. If there is clinical concern for acute ischemia/infarction, an MRI of the brain would be recommended for further evaluation.    LOCATION:  [id number]   Dictated by (CST):  Stromberg, LeRoy, MD on 11/09/2023 at 11:15 AM     Finalized by (CST): Stromberg, LeRoy, MD on 11/09/2023 at 11:19 AM       MRI STROKE BRAIN DWI ONLY(NO IV)(CPT=70551)    Result Date: 11/7/2023  CONCLUSION:  Small linear acute infarct noted at the posterior aspect of the left corona radiata.  Critical results were discussed with Dr. Melton at 2022 hours on 11/7/2023 . Critical results were read back.   LOCATION:  [facility]   Dictated by (CST): Stromberg, LeRoy, MD on 11/07/2023 at 8:14 PM     Finalized by (CST): Stromberg, LeRoy, MD on 11/07/2023 at 8:22 PM             CT STROKE CTA BRAIN/CTA NECK (W IV)(CPT=70496/15884)    Result Date: 11/7/2023  CONCLUSION:  No significant stenosis aneurysmal dilatation involving the major arterial structures in the head and neck.  This critical result was discussed with Dr. Melton at 1342 hours on 11/7/2023. Read back was performed.    LOCATION:  [facility]   Dictated by (CST): Joseluis Ferrell MD on 11/07/2023 at 1:39 PM     Finalized by (CST): Joseluis Ferrell MD on 11/07/2023 at 1:42 PM       CT STROKE BRAIN (NO IV)(CPT=70450)    Result Date: 11/7/2023  PROCEDURE:  CT STROKE BRAIN (NO IV)(CPT=70450)  COMPARISON:  [facility] , CT, CT BRAIN OR HEAD (63197), 6/22/2023, 9:12 PM.  INDICATIONS:  weakness  TECHNIQUE:  Noncontrast CT scanning is performed through the brain.  Dose reduction techniques were used. Dose information is transmitted to the ACR (American College of Radiology) NRDR (National Radiology Data Registry) which includes the Dose Index Registry.  PATIENT STATED HISTORY: (As transcribed by Technologist)  Right arm weakness   FINDINGS: No evidence of intracranial hemorrhage or extra-axial fluid collection. Lucencies in the deep periventricular white matter are likely sequelae of chronic small vessel ischemic disease. Prominence of the sulci is noted. No mass effect. Deviation is septum to the right is noted.  No significant mucoperiosteal thickening of the paranasal sinuses.   Visualized portions of the mastoid air cells are unremarkable. Visualized portions of the orbits are unremarkable. IMPRESSION: Sequelae of chronic small vessel ischemic disease is noted. No evidence of intracranial hemorrhage or extra-axial fluid collection.  Critical results were called to Dr. Melton at 1329 hours on 11/7/2023.  There was appropriate read back.    LOCATION:  [place]   Dictated by (CST): Joseluis Ferrell MD on 11/07/2023 at 1:27 PM     Finalized by (CST): Joseluis Ferrell MD on 11/07/2023 at 1:30 PM                      Impression/ Plan:  This is a 78 year old male with PMHx including but not limited to MI, depression, who presents in follow up after recent stroke.  Patient was seen by neurology at [facility] 11/7/2023 but first presented to this author 12/13/2023.     Per chart review and discussion with patient and caregiver / friend present at time of visit, he was noted to have R UE weakness after a fall.  He had mild R facial droop.  CT/CTA showed no LVO and MRI showed small left subcortical acute ischemic stroke. He was on ASA 81 mg prior to stroke and atorvastatin 20 mg daily. He had Echo with bubble study which showed no shunt/PFO; he was in rehab with PT/OT, speech therapy with subacute rehab.    He also has history of depression and has been following with psychiatry. He currently has mild R hemiparesis and recommend continue  mg daily and atorvastatin 20 mg daily; otherwise, follow with psychiatry and/or PCP for management of depression and discuss recent changes to medications which could lead to drowsiness.    educated regarding stroke risk factors: Patient educated regarding risk factors for stroke, including importance of balanced mediterranean diet, as well as importance of blood glucose control, lipid control, and hypertension management, management of atrial fibrillation     He was also advised to report immediately to the emergency room should he experience any  symptoms including, but not limited to the following: sudden onset of slurred speech, numbness/tingling/weakness on one side of the body, confusion, double vision, loss of vision, vertigo,      1. Hemiparesis of right dominant side as late effect of cerebral infarction (HCC)  As noted above     35 total minutes spent, including chart review, discussion of pertinent labs and imaging with patient and family with education regarding risk of stroke and plan of care; patient and family allowed to ask questions and all questions answered to the best of my ability     Return in about 3 months (around 5/15/2024).    Koby Borrego MD, Neurology  [facility]  Pager [phone]  2/15/2024

## (undated) NOTE — IP AVS SNAPSHOT
Patient Demographics     Address  [address] [address] [place] [address] Phone  [phone] [facility]  [phone] [facility] E-mail Address  [email or web address]      Emergency Contact(s)     Name Relation Home Work Chon Sesay [phone] PRISTIQ  Next dose due: Tomorrow morning      Take 2 tabs daily x 1 week, then 1 tab daily x 1 week, then stop. Ben Hanna MD         guaiFENesin ER 1200 MG Tb12  Next dose due: Tonight, bedtime      Take 1 tablet (1,200 mg total) by mouth nightly.    P 1,200 mg 08/02/21 2221 Given            RIGHT UPPER ARM     Order ID Medication Name Action Time Action Reason Comments    [id number] enoxaparin (LOVENOX) 40 MG/0.4ML injection 40 mg 08/03/21 0936 Given              Recent Vital Signs       Most Recent Valu (accidentally capturing an explicit video of himself). Patient was started on Pristiq which has been increased to 100mg. Patient states he has had progressive issues with a shuffling gait. He states his legs feel weak. Not heavy.  No headache, blurred visio Misc Natural Products (COMPLETE PROSTATE HEALTH OR), Take by mouth., Disp: , Rfl:   guaiFENesin  MG Oral Tablet 12 Hr, Take 1,200 mg by mouth nightly., Disp: , Rfl:   Montelukast Sodium 10 MG Oral Tab, Take 10 mg by mouth nightly., Disp: , Rfl:   Keshawn 87   GFRNAA 75   CA 8.7   ALB 3.3*   *   K 4.6      CO2 29.0   ALKPHO 50   AST 18   ALT 38   BILT 0.4   TP 6.8       Estimated Creatinine Clearance: 67.9 mL/min (based on SCr of 0.97 mg/dL).     No results for input(s): PTP, INR in the last 168 Attribution Santo    PT. 1 - Amy Veliz MD on 7/31/2021  5:36 PM  PT. 2 - Amy Veliz MD on 7/31/2021  6:45 PM  PT. 3 - Amy Veliz MD on 7/31/2021  6:37 PM                        Consults - MD Consult Notes      Consults signed by Kofi Lewis MD medication with difficulty ambulating. As the dose got increased, it did not help with his depressive symptoms. 2) Decrease Xanax from 0.5mg to 0.25mg twice a day to help with anxiety.  Lower dose because benzos can slow cognition and increase the risk in love with her. He took time away from the [facility] and considered leaving to be with her. He was unable away from the [place] and she could sense this. He went to a retreat for priests and had intensive counseling.  Even though the counselor told him th had a psychiatrist and psychotherapist in [place] and is open to seeing Dr. John Scanlon (psychaitrist) again in [place] and establishing care with a psychologist for counseling. The Xanax helps with his anxiety, but slows his thinking.  He doesn't think the Pristiq wa 1,200 mg, Oral, Nightly  •  Mirabegron ER TB24 50 mg, 50 mg, Oral, Daily  •  Montelukast Sodium (SINGULAIR) tab 10 mg, 10 mg, Oral, Nightly  •  enoxaparin (LOVENOX) 40 MG/0.4ML injection 40 mg, 40 mg, Subcutaneous, Daily  •  acetaminophen (TYLENOL) tab 650 No notes of this type exist for this encounter.         Physical Therapy Notes (last 72 hours) (Notes from 7/31/2021  4:31 PM through 8/3/2021  4:31 PM)      Physical Therapy Note signed by Asmita Bernstein PT at 8/2/2021 10:58 AM  Version 1 of 1    Author Past Medical History  Past Medical History:   Diagnosis Date   • Ataxia    • Atherosclerosis of coronary artery    • Depression    • Heart attack ([facility] [address]    • PVC (premature ventricular contraction)    • Stroke (HCC)     min       Past Surgical History Skilled Therapy Provided: per RN pt ok to be seen. Pt received in bed and agreeable to therapy. Pt educated on goals and course of PT session. Pt denies pain and dizziness throughout session. Pt supine to sit to EOB with supervision.  Pt sit to st transfers Sit to/from Stand at assistance level: independent      Goal #3 Patient is able to ambulate 200 feet with assist device: none at assistance level: modified independent      Goal #4 Patient to be modified independent ascending/descending full flig right L1 transverse process fracture as described above.   Extensive multilevel degenerative disc disease and multilevel facet arthropathy of the lumbar spine with multilevel mild central canal stenosis and mild to moderate neural foraminal stenosis from L2 is[AZ.1] return to baseline level of independence, be able to drive again, not fall again[AZ.2]    OBJECTIVE[AZ.1]  Precautions: Bed/chair alarm  Fall Risk: High fall risk[AZ. 2]    WEIGHT BEARING RESTRICTION[AZ.1]  Weight Bearing Restriction: None[AZ. 2] Pattern:  (Patient ambulates outside the IRINEO of the RW)[AZ.2]          Skilled Therapy Provided:[AZ.1] RN approved session[AZ.2], patient was received in supine position in bed with HOB up to 20.   Patient was able to perform supine->sit supervision-indepen include[AZ. 1] AM-PAC 6 click and patient showed 41.77% impairment and patient is below baseline level of independent gait & transfers without the RW inside home[AZ.4]. Based on this evaluation, patient's clinical presentation is[AZ. 1] evolving[AZ.4] and o Notes (last 72 hours) (Notes from 7/31/2021  4:31 PM through 8/3/2021  4:31 PM)      Occupational Therapy Note signed by Darcy Duncan OT at 8/2/2021  4:00 PM  Version 1 of 1    Author: Darcy Duncan OT Service: Rehab Author Type: Occupational WEIGHT BEARING RESTRICTION  Weight Bearing Restriction: None                PAIN ASSESSMENT  Ratin  Location: denies        ACTIVITY TOLERANCE                         O2 SATURATIONS       ACTIVITIES OF DAILY LIVING ASSESSMENT  AM-PAC ‘6-Clicks’ Inpatie seen for OT services this pm. In this session patient making good progress towards OT goals with improvements in independence in BUE HEP, however patient remains below his baseline in balance, endurance, ADLs and other functional areas.  Patient would benef Therapy  Reason for Therapy: ADL/IADL Dysfunction and Discharge Planning    History related to current admission: Patient is 66year old male admitted on 7/31/2021 from home with recent falls, unsteady gait.  Brain CT negative for acute process, recent MRI using rollator in community and walking sticks in house lately. When he stays at his apartment in the [facility], he has lunch and dinner provided in a commons area and housekeeping services are provided.  He states has not been driving lately per doctor's order (setup)    AM-PAC Score:  Score: 17  Approx Degree of Impairment: 50.11%  Standardized Score (AM-PAC Scale): 37.26  CMS Modifier (G-Code): CK    FUNCTIONAL TRANSFER ASSESSMENT  Supine to Sit : Supervision  Sit to Stand: Contact guard assist    Skilled Ther functional mobility, instrumental activities of daily living, rest and sleep, work, leisure and social participation.  Results of the AM-PAC \"6 clicks\" Inpatient Activities of Daily Living Short Form for the patient is 50.11% degree of basic ADL impairmen with Mod I  Patient will perform sit to supine with Mod I  Patient will transfer to toilet with Mod I    ADDITIONAL GOALS  Patient will perform clothing retrieval from around room with 0 cues for safety[BW.1]     Attribution Santo    BW.1 - John De La Torre interventions

## (undated) NOTE — LETTER
Date & Time: 10/15/2022, 10:56 AM  Patient: Wes Hubbard  Encounter Provider(s):    Amari Altamirano MD         This certifies that Luisa Johnson, a patient at an [facility] facility, am leaving the facility voluntarily and against the advice of my physician. I acknowledge that I have been:    1. informed that my physician believes that I need to receive care here;  2. informed that if I leave, I could become sicker or even die; and  3. provided discharge instructions consistent with my current diagnosis. I hereby release my physician, the facility, and its employees from all responsibility for any ill effects which may result from this action. __________________________________  Patient or authorized caregiver signature    __________________________________  RN signature    If no patient or patient representative signature was obtained, sign below to acknowledge that the form was reviewed with the patient and that the patient refused to sign.     __________________________________  RN signature